# Patient Record
Sex: FEMALE | Race: WHITE | Employment: UNEMPLOYED | URBAN - METROPOLITAN AREA
[De-identification: names, ages, dates, MRNs, and addresses within clinical notes are randomized per-mention and may not be internally consistent; named-entity substitution may affect disease eponyms.]

---

## 2019-11-07 PROBLEM — I95.1 ORTHOSTATIC HYPOTENSION: Status: ACTIVE | Noted: 2019-11-07

## 2020-09-09 PROBLEM — M54.50 LOW BACK PAIN: Status: ACTIVE | Noted: 2020-09-09

## 2020-09-09 PROBLEM — M25.552 HIP PAIN, BILATERAL: Status: ACTIVE | Noted: 2020-09-09

## 2020-09-09 PROBLEM — D89.42 IDIOPATHIC MAST CELL ACTIVATION SYNDROME (HCC): Status: ACTIVE | Noted: 2020-09-09

## 2020-09-09 PROBLEM — M25.551 HIP PAIN, BILATERAL: Status: ACTIVE | Noted: 2020-09-09

## 2021-06-09 ENCOUNTER — TRANSCRIBE ORDER (OUTPATIENT)
Dept: SCHEDULING | Age: 51
End: 2021-06-09

## 2021-06-09 DIAGNOSIS — Z12.31 ENCOUNTER FOR SCREENING MAMMOGRAM FOR MALIGNANT NEOPLASM OF BREAST: Primary | ICD-10-CM

## 2022-03-18 PROBLEM — M25.552 HIP PAIN, BILATERAL: Status: ACTIVE | Noted: 2020-09-09

## 2022-03-18 PROBLEM — M25.551 HIP PAIN, BILATERAL: Status: ACTIVE | Noted: 2020-09-09

## 2022-03-18 PROBLEM — M54.50 LOW BACK PAIN: Status: ACTIVE | Noted: 2020-09-09

## 2022-03-19 PROBLEM — I95.1 ORTHOSTATIC HYPOTENSION: Status: ACTIVE | Noted: 2019-11-07

## 2022-03-20 PROBLEM — D89.42 IDIOPATHIC MAST CELL ACTIVATION SYNDROME (HCC): Status: ACTIVE | Noted: 2020-09-09

## 2022-05-26 DIAGNOSIS — E03.9 PRIMARY HYPOTHYROIDISM: Primary | ICD-10-CM

## 2022-06-29 ENCOUNTER — PATIENT MESSAGE (OUTPATIENT)
Dept: ENDOCRINOLOGY | Age: 52
End: 2022-06-29

## 2022-06-29 ENCOUNTER — TELEMEDICINE (OUTPATIENT)
Dept: ENDOCRINOLOGY | Age: 52
End: 2022-06-29
Payer: MEDICARE

## 2022-06-29 DIAGNOSIS — E06.3 HASHIMOTO'S THYROIDITIS: ICD-10-CM

## 2022-06-29 DIAGNOSIS — D35.2 PITUITARY MICROADENOMA (HCC): ICD-10-CM

## 2022-06-29 DIAGNOSIS — E03.9 PRIMARY HYPOTHYROIDISM: Primary | ICD-10-CM

## 2022-06-29 PROCEDURE — 99213 OFFICE O/P EST LOW 20 MIN: CPT | Performed by: INTERNAL MEDICINE

## 2022-06-29 RX ORDER — LEVOTHYROXINE SODIUM 50 MCG
50 TABLET ORAL DAILY
COMMUNITY
End: 2022-06-29 | Stop reason: SDUPTHER

## 2022-06-29 RX ORDER — DICLOFENAC SODIUM 25 MG/1
25 TABLET, DELAYED RELEASE ORAL 2 TIMES DAILY
COMMUNITY
Start: 2022-04-13

## 2022-06-29 RX ORDER — PYRIDOXINE HCL (VITAMIN B6) 50 MG
TABLET ORAL
COMMUNITY

## 2022-06-29 RX ORDER — LEVOTHYROXINE SODIUM 50 MCG
100 TABLET ORAL DAILY
Qty: 180 TABLET | Refills: 3
Start: 2022-06-29

## 2022-06-29 RX ORDER — LANOLIN ALCOHOL/MO/W.PET/CERES
400 CREAM (GRAM) TOPICAL DAILY
COMMUNITY

## 2022-06-29 NOTE — PROGRESS NOTES
Yamila Sutton MD, [address]            Reason for visit: Follow-up of hypothyroidism    I was in the office while conducting this encounter. Consent:  Timi Mccloud, who was evaluated through a synchronous (real-time) audio-video encounter, and/or her healthcare decision maker, is aware that it is a billable service, which includes applicable co-pays, with coverage as determined by her insurance carrier. She provided verbal consent to proceed and patient identification was verified. This visit was conducted pursuant to the emergency declaration under the [facility], [address] [facility] waiver authority and the [employer] Act. A caregiver was present when appropriate. Ability to conduct physical exam was limited. The patient was located at home in a state where the provider was licensed to provide care. This virtual visit was conducted via [address] Pursuant to the emergency declaration under the [facility], [facility] [facility] authority and the [employer] Act, this Virtual  Visit was conducted to reduce the patient's risk of exposure to COVID-19 and provide continuity of care for an established patient. Services were provided through a video synchronous discussion virtually to substitute for in-person clinic visit. Due to this being a TeleHealth evaluation, many elements of the physical examination are unable to be assessed. --Radha Gomez MD on 6/29/2022 at 11:32 AM          ASSESSMENT AND PLAN:    1. Primary hypothyroidism  She is now biochemically euthyroid, excluding her thyroid as a source of symptoms. She is having hot flashes, possibly due to menopause. She may be a candidate for postmenopausal hormone replacement.   She may wish to discuss it with Dr. Juanita Britton or see a gynecologist (I do not prescribe postmenopausal hormone replacement). - SYNTHROID 50 MCG tablet; Take 2 tablets by mouth Daily  Dispense: 180 tablet; Refill: 3  - TSH; Future  - T4, Free; Future    2. Hashimoto's thyroiditis    3. Pituitary microadenoma ([address]  This requires no additional evaluation. Follow-up and Dispositions    · Return in about 6 months (around 12/29/2022). History of Present Illness:    THYROID DYSFUNCTION  Joseph Castro is seen for follow-up of primary hypothyroidism; this was diagnosed in approximately 1989. She is known to have Hashimoto's thyroiditis.     Current symptoms:  See review of systems below     Prior treatment: She was started on Synthroid at diagnosis in ~1989. This was discontinued shortly thereafter. Synthroid was resumed in approximately 2003. She had taken 125 mcg (50 mcg tablets x 2.5) daily since December 2015.   Her dose has been adjusted as follows:  -125 mcg daily six days per week and 100 mcg daily one day per week 3/12/2018   -112.5 mcg daily 6/12/2018  -100 mcg daily 3/14/2022     Pertinent labs:  1/17/2012: TSH 4.700, free T4 1.56.  7/18/2012: TSH 3.000, free T4 1.58.  7/19/2013: TSH 2.0, free T4 0.98.  12/12/2013: TSH 3.080, T4 10.9.  8/19/2014: TSH 5.170, T4 9.6, free thyroxine index 2.6.  10/21/2014: TSH 1.390, T4 11.6, free thyroxine index 3.2.  12/22/2014: TSH 0.792, free T4 1.55.  6/17/2015: TSH 2.13, T4 11.2, free thyroxine index 3.0.  12/10/2015: TSH 6.070, T4 11.5, free thyroxine index 3.1.  2/12/2016: TSH 3.520, T4 10.3, free thyroxine index 2.9.  9/27/2016: TSH 1.54, free T4 1.05.  1/31/2017: TSH 1.030, free T4 1.57.  8/14/2017: TSH 0.493, free T4 1.66.  2/23/2018: TSH 0.323, free T4 1.51.  5/30/2018: TSH 0.266, free T4 1.61.  7/17/2018: TSH 2.570, free T4 1.45.  10/17/2018: TSH 1.560, free T4 1.70.  4/16/2019:  TSH 0.573, free T4 1.44.  10/24/2019: TSH 1.100, free T4 1.53.  4/22/2020: TSH 0.873, free T4 1.70.  9/17/2020: TSH 0.264, free T4 1.62.  3/11/2021: TSH 2.220, free T4 1.26.  2021: TSH 1.200, free T4 1.61.  3/8/2022: TSH 0.149, free T4 1.81, T3 80.  2022: TSH 1.330, free T4 1. 54.     Imagin2015: Ultrasound- 0.4 cm hypoechoic nodule/cyst in the left lobe.     3/12/2018: Ultrasound ([place])- Right lobe 1.12 x 0.93 x 4.10 cm, isthmus 0.16 cm, left lobe 0.80 x 1.37 x 4.30 cm. Homogeneous echotexture. Normal blood flow. No nodules.        PITUITARY DISEASE  Nikko Valerio is here for follow-up of a pituitary microadenoma, first noted in approximately . She was previously under the care of an endocrinologist, Dr. Russell Orr, in [place].     Symptoms: See review of systems below     Menstrual/pregnancy history: She is amenorrheic due to prior endometrial ablation. She has no prior pregnancies and no plans for children.     Imagin2017: MRI ([facility])- 5 mm midline pituitary adenoma.     Laboratory evaluation:  2012: Cortisol 16, prolactin 12, IGF-I 194, estradiol 87.0, LH 7.7, FSH 9.4.  2012: Prolactin 11, IGF-I 176.  2014: Cortisol 14.8, ACTH 43.6, estradiol 26.7.    2014: Estradiol 90.4, LH 16.7, FSH 11.5.  12/10/2015: Cortisol 18.9, ACTH 68.5, prolactin 18.9, IGF-I 139 estradiol 48.4, LH 4.5, FSH 8.2.  2016: Cortisol 15.1, ACTH 52.7.  2016: Prolactin 14. 7.     Prior/current treatment: none. She has taken hydrocortisone off and on for treatment of systemic mast cell disease (managed by Dr. David Hurt). Review of Systems   Constitutional: Positive for fatigue (improved). Negative for unexpected weight change (stable). Musculoskeletal: Positive for arthralgias. Psychiatric/Behavioral: Positive for sleep disturbance (insomnia improved). There were no vitals taken for this visit. Wt Readings from Last 3 Encounters:   22 117 lb (53.1 kg)   21 117 lb (53.1 kg)       Physical Exam  Constitutional:       Appearance: Normal appearance.    HENT:      Head: Normocephalic. Nose: Nose normal.   Eyes:      Extraocular Movements: Extraocular movements intact. Pulmonary:      Effort: Pulmonary effort is normal.   Musculoskeletal:         General: Normal range of motion. Right shoulder: Normal.      Left shoulder: Normal.      Cervical back: Normal range of motion. Skin:     Coloration: Skin is not jaundiced or pale. Neurological:      General: No focal deficit present. Mental Status: She is alert. Mental status is at baseline. Psychiatric:         Mood and Affect: Mood normal.         Behavior: Behavior normal.         Orders Placed This Encounter   Procedures    TSH     Standing Status:   Future     Standing Expiration Date:   6/29/2023    T4, Free     Standing Status:   Future     Standing Expiration Date:   6/29/2023       Current Outpatient Medications   Medication Sig Dispense Refill    diclofenac (VOLTAREN) 25 MG EC tablet Take 25 mg by mouth in the morning and at bedtime      Cyanocobalamin (B-12) 100 MCG TABS Place under the tongue      folic acid (FOLVITE) 953 MCG tablet Take 400 mcg by mouth daily      SYNTHROID 50 MCG tablet Take 2 tablets by mouth Daily 180 tablet 3    acetaminophen (TYLENOL) 650 MG extended release tablet Take 650 mg by mouth every 6 hours as needed      vitamin D 25 MCG (1000 UT) CAPS Take by mouth daily      clobetasol propionate 0.05 % GEL gel Apply topically as needed      clonazePAM (KLONOPIN) 0.125 MG disintegrating tablet Take 0.125 mg by mouth as needed.       cromolyn (NASALCROM) 5.2 MG/ACT nasal spray 1 spray 4 times daily      EPINEPHrine (EPIPEN) 0.3 MG/0.3ML SOAJ injection Inject 0.3 mg into the muscle once as needed      famotidine (PEPCID) 20 MG tablet Take 20 mg by mouth 3 times daily      fexofenadine (ALLEGRA) 180 MG tablet Take 180 mg by mouth 3 times daily      hydrocortisone (CORTEF) 5 MG tablet Take 5 mg by mouth daily as needed      Hydrocortisone Butyrate 0.1 % CREA Apply topically daily      Hyoscyamine Sulfate SL 0.125 MG SUBL Place 0.125 mg under the tongue every 4 hours as needed      ketotifen (ZADITOR) 0.025 % ophthalmic solution Apply 1 drop to eye 2 times daily      zileuton (ZYFLO CR) 600 MG extended release tablet Take 600 mg by mouth       No current facility-administered medications for this visit. Cornelious Gowers, MD, FACE      Portions of this note were generated with the assistance of voice recognition software. As such, some errors in transcription may be present.

## 2022-07-20 ENCOUNTER — HOSPITAL ENCOUNTER (OUTPATIENT)
Dept: PHYSICAL THERAPY | Age: 52
Setting detail: RECURRING SERIES
Discharge: HOME | End: 2022-07-23
Payer: MEDICARE

## 2022-07-20 PROCEDURE — 97110 THERAPEUTIC EXERCISES: CPT

## 2022-07-20 PROCEDURE — 97162 PT EVAL MOD COMPLEX 30 MIN: CPT

## 2022-07-20 ASSESSMENT — PAIN SCALES - GENERAL: PAINLEVEL_OUTOF10: 2

## 2022-07-20 NOTE — PLAN OF CARE
Gia Garcia  : 1970  Primary: [address] Medicare Advantage  Secondary:  [address] @ [address] [place], [place] [address]  Phone: [phone]  Fax: [phone] Plan Frequency: 2x/week for 8 weeks  Plan of Care/Certification Expiration Date: 22    PT Visit Info: Total # of Visits to Date: 1      OUTPATIENT PHYSICAL THERAPY:OP NOTE TYPE: Initial Assessment 2022               Episode  Appt Desk         Treatment Diagnosis:  Pain in Left Shoulder (M25.512)  Pain in left hip (M25.552)  Pain in Left Knee (M25.562)  Pain in Right Hip (M25.551)  * No diagnoses found *  Medical/Referring Diagnosis:  Right hip impingement syndrome [M25.851]  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 She has worked with PT previously on hips and states some improvement overall, but that hips are still bothersome. Her knee was more recently exacerbated by travel and inc walking. The pain is tight/grinding/sharp/shooting in knee and located primarily over pes anserine, but can shift location. She does have systemic mast cell disease which causes inflammatory flareups.    Patient Stated Goal(s):  \"walk and sleep without pain\"  Initial:     2/10 Post Session:     6/10  Past Medical History/Comorbidities:   Ms. Codie Diaz  has a past medical history of Anemia, Anxiety and depression, Basal cell carcinoma of lower extremity, GERD (gastroesophageal reflux disease), Hashimoto's thyroiditis, Irritable bowel syndrome, Mitral regurgitation, Orthostatic hypotension, Osteopenia, Pituitary microadenoma ([address] Primary hypothyroidism, Systemic mast cell disease, and Vitamin B12 deficiency. Ms. Daiana Aviles  has a past surgical history that includes Endometrial ablation (2009); [facility] tooth extraction; malignant skin lesion excision; and Dilation and curettage of uterus. Social History/Living Environment:   Lives With: Spouse  Home Layout: One level   Prior Level of Function/Work/Activity:   Prior level of function: Independent  Occupation: [place] [id number] [employer] recorded   Learning:   Does the patient/guardian have any barriers to learning?: No barriers   Fall Risk Scale: Total Score: 0  Solorio Fall Risk: Low (0-24)       OBJECTIVE   ROM:   L knee ext to -3, uncomfortable. Pt rests in slight flexion  Otherwise ROM NT due to high irritability    Strength:   L HS, quad, add grossly 4/5 and mild pain throughout    Palpation/Joint Mobility:   *Pes anserine, mild in medial quad  Lateral quad NT due to \"painful lump\" per pt    Special Tests:   NT    Functional Tests/Measures:   WFL gait  Squat grossly WFL, painfree with good knee and hip flexion      ASSESSMENT   Initial Assessment:  Radha Desir is a 47 y.o. female who presents to Physical Therapy with signs and symptoms consistent with diagnosis of L knee pain, B hip pain, L shoulder pain. Patient presents today with dec ROM, inc pain, dec endurance/strength, dec exercise tolerance. These deficits limit the patients ability to perform these tasks: walking, sleeping, 1 mile hikes. Patient will benefit from skilled therapy to allow the patient to meet set goals and return to [facility], in which the above noted functional activities were not impaired. Problem List: (Impacting functional limitations): Body Structures, Functions, Activity Limitations Requiring Skilled Therapeutic Intervention: Decreased functional mobility ; Decreased ADL status;  Decreased ROM; Decreased strength; Decreased endurance; Decreased balance; Decreased coordination; Increased pain   Therapy Prognosis:   Therapy Prognosis: Fair   Assessment Complexity:   Medium Complexity  PLAN   Effective Dates: 07/20/22  TO Plan of Care/Certification Expiration Date: 09/20/22   Frequency/Duration: Plan Frequency: 2x/week for 8 weeks   Interventions Planned (Treatment may consist of any combination of the following):    Current Treatment Recommendations: Strengthening; ROM; Balance training; Endurance training; Functional mobility training; Stair training; Gait training; Neuromuscular re-education; Manual Therapy - Soft Tissue Mobilization; Manual Therapy - Joint Manipulation; Pain management; Home exercise program; Modalities; Integrated dry needling; Therapeutic activities   Goals: (Goals have been discussed and agreed upon with patient.)  Short-Term Functional Goals: Time Frame: 4 weeks  Knee extension painfree to >3 deg hyperextension for improved gait  Max knee pain improved to 6/10 for improved sleep  Discharge Goals: Time Frame: 8 weeks  Independent with HEP and progression  Demo goblet squat with good form 10# x5 for improved ability to hike         Outcome Measure: Tool Used: Lower Extremity Functional Scale (LEFS)  Score:  Initial: 36/80 Most Recent: X/80 (Date: -- )   Interpretation of Score: 20 questions each scored on a 5 point scale with 0 representing \"extreme difficulty or unable to perform\" and 4 representing \"no difficulty\". The lower the score, the greater the functional disability. 80/80 represents no disability. Minimal detectable change is 9 points. Medical Necessity:   > Skilled intervention continues to be required due to dec ROM, inc pain, dec endurance. Reason For Services/Other Comments:  > Patient continues to require skilled intervention due to limited ability to progress ability independently.   Total Duration:  Time In: 1615  Time Out: 1700    Regarding Irais Olmstead therapy, I certify that the treatment plan above will be carried out by a therapist or under their direction. Thank you for this referral,  Margarette Fraga, PT     Referring Physician Signature:  Carmen Cadet MD _______________________________ Date _____________        Post Session Pain  Charge Capture  PT Visit Info  POC Link  Treatment Note Link  MD Guidelines  [facility]

## 2022-07-20 NOTE — PROGRESS NOTES
Jarvis Portillo  : 1970  Primary: [address] Medicare Advantage  Secondary:  [address] @ [address]  [facility] [address]  Phone: [phone]  Fax: [phone] No data recorded  No data recorded    PT Visit Info: Total # of Visits to Date: 1    OUTPATIENT PHYSICAL THERAPY:OP NOTE TYPE: Treatment Note 2022       Episode  }Appt Desk             Treatment Diagnosis:  Pain in Left Shoulder (M25.512)  Pain in left hip (M25.552)  Pain in Left Knee (M25.562)  Pain in Right Hip (M25.551)  Medical/Referring Diagnosis:  Right hip impingement syndrome [M25.851]  Chondromalacia of left knee [V02.253]  Referring Physician:  Moni Kaufman MD MD Orders:  PT Eval and Treat   Date of Onset:  No data recorded   Allergies:   Latex, Aspirin, Amoxicillin, Erythromycin, Mometasone, Montelukast, and Petrolatum  Restrictions/Precautions:  No data recordedNo data recorded   Interventions Planned (Treatment may consist of any combination of the following):    No data recorded   Subjective Comments:see initial evaluation     Initial:}    2/10Post Session:       6/10  Medications Last Reviewed:  2022  Updated Objective Findings:  see eval   Treatment   THERAPEUTIC ACTIVITY: (see below for minutes): Therapeutic activities below to improve mobility, strength, balance and coordination. Required minimal visual, verbal, manual and tactile cues to improve independence and safety with daily activities. THERAPEUTIC EXERCISE: (see below for minutes): Exercises below to improve mobility, strength, balance and coordination. Required minimal verbal and manual cues to promote proper body alignment, promote proper body posture and promote proper body mechanics. Progressed resistance, range, repetitions and complexity of movement as indicated. NEUROMUSCULAR RE-ED: (see below for minutes): Neuromuscular re-education to restore normal body movement patterns.   MANUAL THERAPY: (see below for minutes): Joint mobilization and Soft tissue mobilization was utilized and necessary because of the patient's restricted joint motion, painful spasm, loss of articular motion, and restricted motion of soft tissue. MODALITIES: (see below for minutes): to decrease pain and/or swelling  GAIT TRAINING: (see below for minutes): Gait training to improve and/or restore physical functioning as related to mobility, balance and coordination  SELF CARE: (see below for minutes): Self care to improve and/or restore self-care/home management as related to dressing, bathing and grooming. Required minimal verbal cueing to facilitate activities of daily living skills and compensatory activities  MECHANICAL TRACTION: (see below for minutes): Traction was used due to the patient's radiculopathy in order to relieve pain in or originating from the spine. Manual:   Not performed     Exercises/Activities: no charge   Quad set over roll 20x3\"  Prone TKE  20x3\"  Cossack squat  Supported at [employer], x10 B     HEP and Education:  HEP as above, progression, rationale, monitoring of symptoms  Pt has verbalized and demonstrated compliance with HEP and scheduled appointments. Modalities:  N/a       Treatment/Session Summary:    Treatment Assessment: see initial eval     Communication/Consultation:  None today  Equipment provided today:  None  Recommendations/Intent for next treatment session: Next visit will focus on pes retraining, assessment of L shoulder as able.     Total Treatment Billable Duration:  45 minutes  Time In: 6071  Time Out: [address] Chevy Grier PT       Charge Capture  }Post Session Pain  PT Visit Info  [facility] Portal  MD Guidelines  Scanned Media  Benefits  MyChart    Future Appointments   Date Time Provider Robert Conti   7/22/2022  8:45 AM Nimisha Doyle PT [facility]   7/26/2022 10:15 AM Nimisha Doyle PT [facility]   7/29/2022 10:15 AM Nimisha Doyle PT [facility]   1/3/2023  1:30 PM Renee Welch MD END [facility]

## 2022-07-22 ENCOUNTER — APPOINTMENT (OUTPATIENT)
Dept: PHYSICAL THERAPY | Age: 52
End: 2022-07-22
Payer: MEDICARE

## 2022-07-26 ENCOUNTER — APPOINTMENT (OUTPATIENT)
Dept: PHYSICAL THERAPY | Age: 52
End: 2022-07-26
Payer: MEDICARE

## 2022-07-27 ENCOUNTER — HOSPITAL ENCOUNTER (OUTPATIENT)
Dept: PHYSICAL THERAPY | Age: 52
Setting detail: RECURRING SERIES
Discharge: HOME | End: 2022-07-30
Payer: MEDICARE

## 2022-07-27 PROCEDURE — 97110 THERAPEUTIC EXERCISES: CPT

## 2022-07-27 ASSESSMENT — PAIN SCALES - GENERAL: PAINLEVEL_OUTOF10: 0

## 2022-07-27 NOTE — PROGRESS NOTES
Marsha Cox  : 1970  Primary: [address] Medicare Advantage  Secondary:  [address] @ [address] [place] [address]  Phone: [phone]  Fax: [phone] Plan Frequency: 2x/week for 8 weeks  Plan of Care/Certification Expiration Date: 22      PT Visit Info: Total # of Visits to Date: 2      OUTPATIENT PHYSICAL THERAPY:OP NOTE TYPE: Treatment Note 2022       Episode  }Appt Desk             Treatment Diagnosis:  Pain in Left Shoulder (M25.512)  Pain in left hip (M25.552)  Pain in Left Knee (M25.562)  Pain in Right Hip (M25.551)  Medical/Referring Diagnosis:  Right hip impingement syndrome [M25.851]  Chondromalacia of left knee [P26.254]  Referring Physician:  Ewa Lucero MD MD Orders:  PT Eval and Treat   Date of Onset:  No data recorded   Allergies:   Latex, Aspirin, Amoxicillin, Erythromycin, Mometasone, Montelukast, and Petrolatum  Restrictions/Precautions:  No data recordedNo data recorded   Interventions Planned (Treatment may consist of any combination of the following):    Current Treatment Recommendations: Strengthening; ROM; Balance training; Endurance training; Functional mobility training; Stair training; Gait training; Neuromuscular re-education; Manual Therapy - Soft Tissue Mobilization; Manual Therapy - Joint Manipulation; Pain management; Home exercise program; Modalities; Integrated dry needling; Therapeutic activities     Subjective Comments:Pt was very fatigued after last treatment. She says this is normal for her but to make sure new exercises are added earlier in the session if she needs to be able to recall them later. Overall feeling okay today and HEP went well. Initial:}    0/10Post Session:       0/10  Medications Last Reviewed:  2022  Updated Objective Findings:  see eval   Treatment   THERAPEUTIC ACTIVITY: (see below for minutes):  Therapeutic activities below to improve mobility, strength, balance and coordination. Required minimal visual, verbal, manual and tactile cues to improve independence and safety with daily activities. THERAPEUTIC EXERCISE: (see below for minutes): Exercises below to improve mobility, strength, balance and coordination. Required minimal verbal and manual cues to promote proper body alignment, promote proper body posture and promote proper body mechanics. Progressed resistance, range, repetitions and complexity of movement as indicated. NEUROMUSCULAR RE-ED: (see below for minutes): Neuromuscular re-education to restore normal body movement patterns. MANUAL THERAPY: (see below for minutes): Joint mobilization and Soft tissue mobilization was utilized and necessary because of the patient's restricted joint motion, painful spasm, loss of articular motion, and restricted motion of soft tissue. MODALITIES: (see below for minutes): to decrease pain and/or swelling  GAIT TRAINING: (see below for minutes): Gait training to improve and/or restore physical functioning as related to mobility, balance and coordination  SELF CARE: (see below for minutes): Self care to improve and/or restore self-care/home management as related to dressing, bathing and grooming. Required minimal verbal cueing to facilitate activities of daily living skills and compensatory activities  MECHANICAL TRACTION: (see below for minutes): Traction was used due to the patient's radiculopathy in order to relieve pain in or originating from the spine. Manual:   Not performed     Exercises/Activities: 45' TE 5' supervision by Susana Jones PT  Quad set over roll 20x3\"  Prone TKE  20x5\" with bolster  Cossack squat  Supported at high mat, x10 B  - held  Standing hip add Slider 11 then 8 B  Toe up bridge  3x8, 5\" lower   Standing TKE  20x3\"  Dock swings  1'    L shoulder exercise - nv?     HEP and Education:  HEP as above, progression, rationale, monitoring of symptoms  Pt has verbalized and demonstrated compliance with HEP and scheduled appointments. Modalities:  N/a       Treatment/Session Summary:    Treatment Assessment: Pt with some inc in pain during session but this was modifiable by changing reps or positioning. Initiated adductor slide to replace cossack squat at home. Communication/Consultation:  None today  Equipment provided today:  None  Recommendations/Intent for next treatment session: Next visit will focus on pes retraining, assessment of L shoulder as able.     Total Treatment Billable Duration:  45 minutes  Time In: 6166  Time Out: [address] Paola Zamudio, PT       Charge Capture  }Post Session Pain  PT Visit Info  [employer] Portal  MD Guidelines  Scanned Media  Benefits  MyChart    Future Appointments   Date Time Provider Robert Conti   7/29/2022  4:15 PM Kaleen Corwith, PT [facility]   8/1/2022  3:30 PM Kaleen Corwith, PT [facility]   8/3/2022  3:30 PM Kaleen Corwith, PT [facility]   8/9/2022  2:00 PM Kaleen Corwith, PT [facility]   8/10/2022  3:30 PM Kaleen Corwith, PT [facility]   8/15/2022  3:30 PM Kaleen Corwith, PT [facility]   8/17/2022  3:30 PM Kaleen Corwith, PT [facility]   8/23/2022  2:00 PM Kaleen Corwith, PT [facility]   8/24/2022  3:30 PM Kaleen Corwith, PT [facility]   8/29/2022  3:30 PM Kaleen Corwith, PT [facility]   8/31/2022  3:30 PM Kaleen Corwith, PT [facility]   1/3/2023  1:30 PM Abby Dejesus MD END [facility]

## 2022-07-29 ENCOUNTER — HOSPITAL ENCOUNTER (OUTPATIENT)
Dept: PHYSICAL THERAPY | Age: 52
Setting detail: RECURRING SERIES
Discharge: HOME | End: 2022-08-01
Payer: MEDICARE

## 2022-07-29 PROCEDURE — 97140 MANUAL THERAPY 1/> REGIONS: CPT

## 2022-07-29 PROCEDURE — 97110 THERAPEUTIC EXERCISES: CPT

## 2022-07-29 ASSESSMENT — PAIN SCALES - GENERAL: PAINLEVEL_OUTOF10: 1

## 2022-07-29 NOTE — PROGRESS NOTES
independence and safety with daily activities. THERAPEUTIC EXERCISE: (see below for minutes): Exercises below to improve mobility, strength, balance and coordination. Required minimal verbal and manual cues to promote proper body alignment, promote proper body posture and promote proper body mechanics. Progressed resistance, range, repetitions and complexity of movement as indicated. NEUROMUSCULAR RE-ED: (see below for minutes): Neuromuscular re-education to restore normal body movement patterns. MANUAL THERAPY: (see below for minutes): Joint mobilization and Soft tissue mobilization was utilized and necessary because of the patient's restricted joint motion, painful spasm, loss of articular motion, and restricted motion of soft tissue. MODALITIES: (see below for minutes): to decrease pain and/or swelling  GAIT TRAINING: (see below for minutes): Gait training to improve and/or restore physical functioning as related to mobility, balance and coordination  SELF CARE: (see below for minutes): Self care to improve and/or restore self-care/home management as related to dressing, bathing and grooming. Required minimal verbal cueing to facilitate activities of daily living skills and compensatory activities  MECHANICAL TRACTION: (see below for minutes): Traction was used due to the patient's radiculopathy in order to relieve pain in or originating from the spine.     Manual: 8'  Gr II ap and inf glide    Exercises/Activities: 27' TE  SL ER   L 3x8 with bolster, 1 min break in between  SB bridge   3x6, 1 min break in between  SB curl   3x10  Education - current findings, rationale, progression of shoulder control and mobility    Held  Quad set over roll 20x3\"  Prone TKE  20x5\" with bolster  Cossack squat  Supported at high mat, x10 B  - held  Standing hip add Slider 11 then 8 B  Toe up bridge  3x8, 5\" lower   Standing TKE  20x3\"  Dock swings  1'      HEP and Education:  HEP as above, progression, rationale, monitoring of symptoms  Pt has verbalized and demonstrated compliance with HEP and scheduled appointments. Modalities:  N/a       Treatment/Session Summary:    Treatment Assessment: Improved shoulder pain and mobility after manual interventions and light exercise. Able to progress with glut/HS control. Some popping felt in lateral HS with ball curls; this was relieved with external support/overpressure. Communication/Consultation:  None today  Equipment provided today:  None  Recommendations/Intent for next treatment session: Next visit will focus on pes retraining, assessment of L shoulder as able.     Total Treatment Billable Duration:  45 minutes  Time In: 0041  Time Out: [address] Grier, PT       Charge Capture  }Post Session Pain  PT Visit Info  [employer] Portal  MD Guidelines  Scanned Media  Benefits  MyChart    Future Appointments   Date Time Provider Robert Conti   8/1/2022  3:30 PM [facility]   8/3/2022  3:30 PM Saul Gave, PT [facility]   8/9/2022  2:00 PM Saul Gave, PT [facility]   8/11/2022  2:45 PM Saul Gave, PT [facility]   8/15/2022  3:30 PM Saul Gave, PT [facility]   8/17/2022  3:30 PM Saul Gave, PT [facility]   8/23/2022  2:00 PM Saul Gave, PT [facility]   8/24/2022  3:30 PM Saul Gave, PT [facility]   8/29/2022  3:30 PM Saul Gave, PT [facility]   8/31/2022  3:30 PM Saul Gave, PT [facility]   1/3/2023  1:30 PM Justina Ruiz MD [facility]

## 2022-08-01 ENCOUNTER — HOSPITAL ENCOUNTER (OUTPATIENT)
Dept: PHYSICAL THERAPY | Age: 52
Setting detail: RECURRING SERIES
Discharge: HOME | End: 2022-08-04
Payer: MEDICARE

## 2022-08-01 PROCEDURE — 97110 THERAPEUTIC EXERCISES: CPT

## 2022-08-01 ASSESSMENT — PAIN SCALES - GENERAL: PAINLEVEL_OUTOF10: 1

## 2022-08-01 NOTE — PROGRESS NOTES
Anirudh Castellano  : 1970  Primary: [address] Medicare Advantage  Secondary:  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]  Referring Physician:  Luis Alfredo Marte MD MD Orders:  PT Eval and Treat   Date of Onset:  No data recorded   Allergies:   Latex, Aspirin, Amoxicillin, Erythromycin, Mometasone, Montelukast, and Petrolatum  Restrictions/Precautions:  No data recordedNo data recorded   Interventions Planned (Treatment may consist of any combination of the following):    Current Treatment Recommendations: Strengthening; ROM; Balance training; Endurance training; Functional mobility training; Stair training; Gait training; Neuromuscular re-education; Manual Therapy - Soft Tissue Mobilization; Manual Therapy - Joint Manipulation; Pain management; Home exercise program; Modalities; Integrated dry needling; Therapeutic activities     Subjective Comments: Pt reports cont inc mobility in L shoulder since last visit. She did feel like entire L side of body tightened up over the weekend, but overall no significant inc in pain. Initial:}    1/10Post Session:       0/10  Medications Last Reviewed:  2022  Updated Objective Findings:  see eval   Treatment   THERAPEUTIC ACTIVITY: (see below for minutes): Therapeutic activities below to improve mobility, strength, balance and coordination.  Required minimal visual, verbal, manual and tactile cues TKE  20x3\"  Dock swings  1'      HEP and Education:  HEP as above, progression, rationale, monitoring of symptoms  Pt has verbalized and demonstrated compliance with HEP and scheduled appointments. Modalities:  N/a       Treatment/Session Summary:    Treatment Assessment: Pt did very well with new additions today. No significant fatigue reported at end of treatment. Communication/Consultation:  None today  Equipment provided today:  None  Recommendations/Intent for next treatment session: Next visit will focus on pes retraining, assessment of L shoulder as able.     Total Treatment Billable Duration:  45 minutes  Time In: 1268  Time Out: [address], PT       Charge Capture  }Post Session Pain  PT Visit Info  [employer] Portal  MD Guidelines  Scanned Media  Benefits  MyChart    Future Appointments   Date Time Provider [place]   8/3/2022  3:30 PM Teodora Lights, PT [facility]   8/9/2022  2:00 PM Teodora Lights, PT [facility]   8/11/2022  2:45 PM Teodora Lights, PT [facility]   8/15/2022  3:30 PM Teodora Lights, PT [facility]   8/17/2022  3:30 PM Teodora Lights, PT [facility]   8/23/2022  2:00 PM Teodora Lights, PT [facility]   8/24/2022  3:30 PM Teodora Lights, PT [facility]   8/29/2022  3:30 PM Teodora Lights, PT [facility]   8/31/2022  3:30 PM Teodora Lights, PT [facility]   1/3/2023  1:30 PM Ashlee Lemus MD END [facility]

## 2022-08-03 ENCOUNTER — HOSPITAL ENCOUNTER (OUTPATIENT)
Dept: PHYSICAL THERAPY | Age: 52
Setting detail: RECURRING SERIES
Discharge: HOME | End: 2022-08-06
Payer: MEDICARE

## 2022-08-03 PROCEDURE — 97110 THERAPEUTIC EXERCISES: CPT

## 2022-08-03 ASSESSMENT — PAIN SCALES - GENERAL: PAINLEVEL_OUTOF10: 4

## 2022-08-03 NOTE — PROGRESS NOTES
Loida Nava  : 1970  Primary: [address] Medicare Advantage  Secondary:  [address] @ [address] [place], [place] [id number]  Phone: [phone]  Fax: [phone] Plan Frequency: 2x/week for 8 weeks  Plan of Care/Certification Expiration Date: 22      PT Visit Info: Total # of Visits to Date: 5      OUTPATIENT PHYSICAL THERAPY:OP NOTE TYPE: Treatment Note 8/3/2022       Episode  }Appt Desk             Treatment Diagnosis:  Pain in Left Shoulder (M25.512)  Pain in left hip (M25.552)  Pain in Left Knee (M25.562)  Pain in Right Hip (M25.551)  Medical/Referring Diagnosis:  Right hip impingement syndrome [M25.851]  Chondromalacia of left knee [S19.848]  Referring Physician:  Gena Lancaster MD MD Orders:  PT Eval and Treat   Date of Onset:  No data recorded   Allergies:   Latex, Aspirin, Amoxicillin, Erythromycin, Mometasone, Montelukast, and Petrolatum  Restrictions/Precautions:  No data recordedNo data recorded   Interventions Planned (Treatment may consist of any combination of the following):    Current Treatment Recommendations: Strengthening; ROM; Balance training; Endurance training; Functional mobility training; Stair training; Gait training; Neuromuscular re-education; Manual Therapy - Soft Tissue Mobilization; Manual Therapy - Joint Manipulation; Pain management; Home exercise program; Modalities; Integrated dry needling; Therapeutic activities     Subjective Comments: Pt states she went on short walks in the evening of last session and the following morning. She did great in the evening but had some inc fatigue and discomfort after the morning walk. Today, L knee is a little bothersome and so is R hip. Initial:}    4/10Post Session:       3/10  Medications Last Reviewed:  8/3/2022  Updated Objective Findings:  see eval   Treatment   THERAPEUTIC ACTIVITY: (see below for minutes):  Therapeutic activities below to improve mobility, strength, balance and coordination. Required minimal visual, verbal, manual and tactile cues to improve independence and safety with daily activities. THERAPEUTIC EXERCISE: (see below for minutes): Exercises below to improve mobility, strength, balance and coordination. Required minimal verbal and manual cues to promote proper body alignment, promote proper body posture and promote proper body mechanics. Progressed resistance, range, repetitions and complexity of movement as indicated. NEUROMUSCULAR RE-ED: (see below for minutes): Neuromuscular re-education to restore normal body movement patterns. MANUAL THERAPY: (see below for minutes): Joint mobilization and Soft tissue mobilization was utilized and necessary because of the patient's restricted joint motion, painful spasm, loss of articular motion, and restricted motion of soft tissue. MODALITIES: (see below for minutes): to decrease pain and/or swelling  GAIT TRAINING: (see below for minutes): Gait training to improve and/or restore physical functioning as related to mobility, balance and coordination  SELF CARE: (see below for minutes): Self care to improve and/or restore self-care/home management as related to dressing, bathing and grooming. Required minimal verbal cueing to facilitate activities of daily living skills and compensatory activities  MECHANICAL TRACTION: (see below for minutes): Traction was used due to the patient's radiculopathy in order to relieve pain in or originating from the spine.     Manual: 8' - held  Gr II ap and inf glide    Exercises/Activities: 36' TE  SB curl   2x8  SB bridge   3x6, 1 min break in between   DL HR   2x15  Sidestep at high mat 5x mini squat  B stance RDL  2x8 B - inc cueing    Held  SL ER   L 3x8 with bolster, 1 min break in between  Supine punch  Red 3x8, 1 min break in between  Quad set over roll 30x5\"    Held  Prone TKE  20x5\" with bolster  Cossack squat  Supported at high mat, x10 B  - held  Standing hip add Slider 11 then 8

## 2022-08-09 ENCOUNTER — HOSPITAL ENCOUNTER (OUTPATIENT)
Dept: PHYSICAL THERAPY | Age: 52
Setting detail: RECURRING SERIES
Discharge: HOME | End: 2022-08-12
Payer: MEDICARE

## 2022-08-09 PROCEDURE — 97140 MANUAL THERAPY 1/> REGIONS: CPT

## 2022-08-09 PROCEDURE — 97110 THERAPEUTIC EXERCISES: CPT

## 2022-08-09 ASSESSMENT — PAIN SCALES - GENERAL: PAINLEVEL_OUTOF10: 5

## 2022-08-09 NOTE — PROGRESS NOTES
Lety Beth  : 1970  Primary: [address] Medicare Advantage  Secondary:  [address] @ [address] [place] [address]  Phone: [phone]  Fax: [phone] Plan Frequency: 2x/week for 8 weeks  Plan of Care/Certification Expiration Date: 22      PT Visit Info: Total # of Visits to Date: 6      OUTPATIENT PHYSICAL THERAPY:OP NOTE TYPE: Treatment Note 2022       Episode  }Appt Desk             Treatment Diagnosis:  Pain in Left Shoulder (M25.512)  Pain in left hip (M25.552)  Pain in Left Knee (M25.562)  Pain in Right Hip (M25.551)  Medical/Referring Diagnosis:  Right hip impingement syndrome [M25.851]  Chondromalacia of left knee [J22.430]  Referring Physician:  Kyle Martin MD MD Orders:  PT Eval and Treat   Date of Onset:  No data recorded   Allergies:   Latex, Aspirin, Amoxicillin, Erythromycin, Mometasone, Montelukast, and Petrolatum  Restrictions/Precautions:  No data recordedNo data recorded   Interventions Planned (Treatment may consist of any combination of the following):    Current Treatment Recommendations: Strengthening; ROM; Balance training; Endurance training; Functional mobility training; Stair training; Gait training; Neuromuscular re-education; Manual Therapy - Soft Tissue Mobilization; Manual Therapy - Joint Manipulation; Pain management; Home exercise program; Modalities; Integrated dry needling; Therapeutic activities     Subjective Comments: Pt had an increase in knee pain the evening of last treatment. It took 2-3 days to calm down. It is hurting a little more today overall. Initial:}    5/10Post Session:       4/10  Medications Last Reviewed:  2022  Updated Objective Findings:  see eval   Treatment   THERAPEUTIC ACTIVITY: (see below for minutes): Therapeutic activities below to improve mobility, strength, balance and coordination.  Required minimal visual, verbal, manual and tactile cues to improve independence and safety with daily activities. THERAPEUTIC EXERCISE: (see below for minutes): Exercises below to improve mobility, strength, balance and coordination. Required minimal verbal and manual cues to promote proper body alignment, promote proper body posture and promote proper body mechanics. Progressed resistance, range, repetitions and complexity of movement as indicated. NEUROMUSCULAR RE-ED: (see below for minutes): Neuromuscular re-education to restore normal body movement patterns. MANUAL THERAPY: (see below for minutes): Joint mobilization and Soft tissue mobilization was utilized and necessary because of the patient's restricted joint motion, painful spasm, loss of articular motion, and restricted motion of soft tissue. MODALITIES: (see below for minutes): to decrease pain and/or swelling  GAIT TRAINING: (see below for minutes): Gait training to improve and/or restore physical functioning as related to mobility, balance and coordination  SELF CARE: (see below for minutes): Self care to improve and/or restore self-care/home management as related to dressing, bathing and grooming. Required minimal verbal cueing to facilitate activities of daily living skills and compensatory activities  MECHANICAL TRACTION: (see below for minutes): Traction was used due to the patient's radiculopathy in order to relieve pain in or originating from the spine.     Manual: 8'  Gr II ap and inf glide - held  Tennis ball roll adductor (STR)    Exercises/Activities: 28' TE  SB curl   2x8 - held  SB bridge   3x6, 1 min break in between   DL HR   2x12  Sidestep at high mat 3x mini squat  B stance RDL  2x8 B - inc cueing  Donkey kick  Slow lower - held    [employer] ER   L 3x8 with bolster, 1 min break in between  Supine punch  Red 3x8, 1 min break in between  Quad set over roll 30x5\"    Held  Prone TKE  20x5\" with bolster  Cossack squat  Supported at high mat, x10 B  - held  Standing hip add Slider 11 then 8 B  Toe up bridge  3x8, 5\" lower   Standing TKE  20x3\"  Dock swings  1'      HEP and Education:  HEP as above, progression, rationale, monitoring of symptoms  Pt has verbalized and demonstrated compliance with HEP and scheduled appointments. Modalities:  N/a       Treatment/Session Summary:    Treatment Assessment: Some improvement in medial knee pain after STR. Discussed use of tennis ball rolling at home. Reduced intensity of exercises but continued with similar exercises to last visit. Communication/Consultation:  None today  Equipment provided today:  None  Recommendations/Intent for next treatment session: Next visit will focus on pes retraining, assessment of L shoulder as able.     Total Treatment Billable Duration:  45 minutes  Time In: 1400  Time Out: [address], PT       Charge Capture  }Post Session Pain  PT Visit Info  [employer] Portal  MD Guidelines  Scanned Media  Benefits  MyChart    Future Appointments   Date Time Provider Robert Conti   8/11/2022  2:45 PM Brittani Bunn, PT [facility]   8/15/2022  3:30 PM Brittani Bunn, PT [facility]   8/17/2022  3:30 PM Brittani Bunn, PT [facility]   8/23/2022  2:00 PM Brittani , PT [facility]   8/24/2022  3:30 PM Brittani Bunn, PT [facility]   8/29/2022  3:30 PM Brittani Bunn, PT [facility]   8/31/2022  3:30 PM Brittani , PT [facility]   1/3/2023  1:30 PM Jason Connell MD END [facility]

## 2022-08-10 ENCOUNTER — APPOINTMENT (OUTPATIENT)
Dept: PHYSICAL THERAPY | Age: 52
End: 2022-08-10
Payer: MEDICARE

## 2022-08-11 ENCOUNTER — HOSPITAL ENCOUNTER (OUTPATIENT)
Dept: PHYSICAL THERAPY | Age: 52
Setting detail: RECURRING SERIES
Discharge: HOME | End: 2022-08-14
Payer: MEDICARE

## 2022-08-11 PROCEDURE — 97110 THERAPEUTIC EXERCISES: CPT

## 2022-08-11 ASSESSMENT — PAIN SCALES - GENERAL: PAINLEVEL_OUTOF10: 4

## 2022-08-11 NOTE — PROGRESS NOTES
Светлана Daugherty  : 1970  Primary: [address] Medicare Advantage  Secondary:  [address] @ [address] [place], [place] [address]  Phone: [phone]  Fax: [phone] Plan Frequency: 2x/week for 8 weeks  Plan of Care/Certification Expiration Date: 22      PT Visit Info: Total # of Visits to Date: 7      OUTPATIENT PHYSICAL THERAPY:OP NOTE TYPE: Treatment Note 2022       Episode  }Appt Desk             Treatment Diagnosis:  Pain in Left Shoulder (M25.512)  Pain in left hip (M25.552)  Pain in Left Knee (M25.562)  Pain in Right Hip (M25.551)  Medical/Referring Diagnosis:  Right hip impingement syndrome [M25.851]  Chondromalacia of left knee [L69.812]  Referring Physician:  Kanchan Funes MD MD Orders:  PT Eval and Treat   Date of Onset:  No data recorded   Allergies:   Latex, Aspirin, Amoxicillin, Erythromycin, Mometasone, Montelukast, and Petrolatum  Restrictions/Precautions:  No data recordedNo data recorded   Interventions Planned (Treatment may consist of any combination of the following):    Current Treatment Recommendations: Strengthening; ROM; Balance training; Endurance training; Functional mobility training; Stair training; Gait training; Neuromuscular re-education; Manual Therapy - Soft Tissue Mobilization; Manual Therapy - Joint Manipulation; Pain management; Home exercise program; Modalities; Integrated dry needling; Therapeutic activities     Subjective Comments: Pt reports she did better after last visit. Ball rolling on adductor was helpful to manage knee discomfort. She was still very fatigued after last visit. Initial:}    4/10Post Session:       4/10  Medications Last Reviewed:  2022  Updated Objective Findings:  see eval   Treatment   THERAPEUTIC ACTIVITY: (see below for minutes): Therapeutic activities below to improve mobility, strength, balance and coordination.  Required minimal visual, verbal, manual and tactile cues to improve

## 2022-08-15 ENCOUNTER — HOSPITAL ENCOUNTER (OUTPATIENT)
Dept: PHYSICAL THERAPY | Age: 52
Setting detail: RECURRING SERIES
Discharge: HOME | End: 2022-08-18
Payer: MEDICARE

## 2022-08-15 PROCEDURE — 97110 THERAPEUTIC EXERCISES: CPT

## 2022-08-15 ASSESSMENT — PAIN SCALES - GENERAL: PAINLEVEL_OUTOF10: 1

## 2022-08-15 NOTE — PROGRESS NOTES
Jermaine Cooper  : 1970  Primary: [address] Medicare Advantage  [address]:  [address] @ [address] [place], [place] [address]  Phone: [phone]  Fax: [phone] Plan Frequency: 2x/week for 8 weeks  Plan of Care/Certification Expiration Date: 22      PT Visit Info: Total # of Visits to Date: 8      OUTPATIENT PHYSICAL THERAPY:OP NOTE TYPE: Progress Note & Treatment Note 8/15/2022       Episode  }Appt Desk             Treatment Diagnosis:  Pain in Left Shoulder (M25.512)  Pain in left hip (M25.552)  Pain in Left Knee (M25.562)  Pain in Right Hip (M25.551)  Medical/Referring Diagnosis:  Right hip impingement syndrome [M25.851]  Chondromalacia of left knee [P51.736]  Referring Physician:  Carolynn Macdonald MD MD Orders:  PT Eval and Treat   Date of Onset:  No data recorded   Allergies:   Latex, Aspirin, Amoxicillin, Erythromycin, Mometasone, Montelukast, and Petrolatum  Restrictions/Precautions:  No data recordedNo data recorded   Interventions Planned (Treatment may consist of any combination of the following):    Current Treatment Recommendations: Strengthening; ROM; Balance training; Endurance training; Functional mobility training; Stair training; Gait training; Neuromuscular re-education; Manual Therapy - Soft Tissue Mobilization; Manual Therapy - Joint Manipulation; Pain management; Home exercise program; Modalities; Integrated dry needling; Therapeutic activities     Subjective Comments: Pt states she is doing better overall. She is slowly trying to decrease the diclofenac she has been taking (3x per day to BID). She has been able to walk for exercise for the last four nights. L knee is still sore/painful, but overall feels much more manageable. Initial:}    1/10Post Session:       1/10  Medications Last Reviewed:  8/15/2022  Updated Objective Findings:    ROM:   L knee ext to -3, uncomfortable.  Pt rests in slight flexion  Otherwise ROM NT due to high irritability     Strength:   L HS, quad, add grossly 4+/5, dec pain in medial knee, some discomfort in superior patellar region    Shoulder ER: R 4+/5 L 4/5, mild pain     Palpation/Joint Mobility:   *Pes anserine, mild in medial quad  Lateral quad NT due to \"painful lump\" per pt     Special Tests:   NT     Functional Tests/Measures:   WFL gait  Squat grossly WFL, painfree with good knee and hip flexion    Effective Dates: 07/20/22  TO Plan of Care/Certification Expiration Date: 09/20/22   Frequency/Duration: Plan Frequency: 2x/week for 8 weeks   Interventions Planned (Treatment may consist of any combination of the following):    Current Treatment Recommendations: Strengthening; ROM; Balance training; Endurance training; Functional mobility training; Stair training; Gait training; Neuromuscular re-education; Manual Therapy - Soft Tissue Mobilization; Manual Therapy - Joint Manipulation; Pain management; Home exercise program; Modalities; Integrated dry needling; Therapeutic activities   Goals: (Goals have been discussed and agreed upon with patient.)  Short-Term Functional Goals: Time Frame: 4 weeks  Knee extension painfree to >3 deg hyperextension for improved gait - partially met 8/15  Max knee pain improved to 6/10 for improved sleep - met 8/15  Discharge Goals: Time Frame: 8 weeks  Independent with HEP and progression  Demo goblet squat with good form 10# x5 for improved ability to hike   LEFS 45/80 8/15    Treatment   THERAPEUTIC ACTIVITY: (see below for minutes): Therapeutic activities below to improve mobility, strength, balance and coordination. Required minimal visual, verbal, manual and tactile cues to improve independence and safety with daily activities. THERAPEUTIC EXERCISE: (see below for minutes): Exercises below to improve mobility, strength, balance and coordination.  Required minimal verbal and manual cues to promote proper body alignment, promote proper body posture and promote proper body mechanics. Progressed resistance, range, repetitions and complexity of movement as indicated. NEUROMUSCULAR RE-ED: (see below for minutes): Neuromuscular re-education to restore normal body movement patterns. MANUAL THERAPY: (see below for minutes): Joint mobilization and Soft tissue mobilization was utilized and necessary because of the patient's restricted joint motion, painful spasm, loss of articular motion, and restricted motion of soft tissue. MODALITIES: (see below for minutes): to decrease pain and/or swelling  GAIT TRAINING: (see below for minutes): Gait training to improve and/or restore physical functioning as related to mobility, balance and coordination  SELF CARE: (see below for minutes): Self care to improve and/or restore self-care/home management as related to dressing, bathing and grooming. Required minimal verbal cueing to facilitate activities of daily living skills and compensatory activities  MECHANICAL TRACTION: (see below for minutes): Traction was used due to the patient's radiculopathy in order to relieve pain in or originating from the spine. Manual: 8' - held  Gr II ap and inf glide - held  Tennis ball roll adductor (STR)    Exercises/Activities: 36' TE  SL ER   1# L 3x6 with bolster, 1 min break in between - held  Seated ER  Red 3x5, 1 min break in between  Supine punch/flx Red 3x9, 1 min break in between (seated nv?)  SB curl   2x8  SB bridge   3x6, 1 min break in between   DL HR   2x12  Sidestep at high mat 3x mini squat  B stance RDL  2x5 B 5#  Donkey kick  Slow lower - held today    [employer] over roll 30x5\"  Prone TKE  20x5\" with bolster  Cossack squat  Supported at [employer], x10 B  - held  Standing hip add Slider 11 then 8 B  Toe up bridge  3x8, 5\" lower   Standing TKE  20x3\"  Dock swings  1'      HEP and Education:  HEP as above, progression, rationale, monitoring of symptoms  Pt has verbalized and demonstrated compliance with HEP and scheduled appointments. Modalities:  N/a       Treatment/Session Summary:    Treatment Assessment: Patient has made improvements with ROM, pain, strength. Pt continues to have limitations with pain, strength, endurance. Pt will continue to benefit from skilled outpatient Physical Therapy to address these deficits, and return to previous level of function in which the above deficits were not noted. Communication/Consultation:  None today  Equipment provided today:  None  Recommendations/Intent for next treatment session: Next visit will focus on pes retraining, assessment of L shoulder as able.     Total Treatment Billable Duration:  40 minutes  Time In: 1397  Time Out: [address] Zamudio, PT       Charge Capture  }Post Session Pain  PT Visit Info  [employer] Portal  MD Guidelines  Scanned Media  Benefits  MyChart    Future Appointments   Date Time Provider Robert Conti   8/17/2022  3:30 PM Deedee Mutter, PT [facility]   8/23/2022  2:00 PM Deedee Mutter, PT [facility]   8/24/2022  3:30 PM Deedee Mutter, PT [facility]   8/29/2022  3:30 PM Deedee Mutter, PT [facility]   8/31/2022  3:30 PM Deedee Mutter, PT [facility]   9/6/2022  2:00 PM Deedee Mutter, PT [facility]   9/8/2022  2:00 PM Deedee Mutter, PT [facility]   9/13/2022  2:00 PM Deedee Mutter, PT [facility]   9/15/2022  2:00 PM Deedee Mutter, PT [facility]   9/20/2022  2:00 PM Deedee Mutter, PT [facility]   9/22/2022  2:00 PM Deedee Mutter, PT [facility]   9/27/2022  2:00 PM Deedee Mutter, PT [facility]   9/29/2022  2:00 PM Deedee Mutter, PT [facility]   1/3/2023  1:30 PM Kathyleen Koyanagi, MD [facility]

## 2022-08-17 ENCOUNTER — HOSPITAL ENCOUNTER (OUTPATIENT)
Dept: PHYSICAL THERAPY | Age: 52
Setting detail: RECURRING SERIES
Discharge: HOME | End: 2022-08-20
Payer: MEDICARE

## 2022-08-17 PROCEDURE — 97110 THERAPEUTIC EXERCISES: CPT

## 2022-08-17 ASSESSMENT — PAIN SCALES - GENERAL: PAINLEVEL_OUTOF10: 3

## 2022-08-17 NOTE — PROGRESS NOTES
Alisa Perez  : 1970  Primary: [address] Medicare Advantage  Secondary:  [address] @ [address] [place] [address]  Phone: [phone]  Fax: [phone] Plan Frequency: 2x/week for 8 weeks  Plan of Care/Certification Expiration Date: 22      PT Visit Info: Total # of Visits to Date: 9      OUTPATIENT PHYSICAL THERAPY:OP NOTE TYPE: Progress Note & Treatment Note 2022       Episode  }Appt Desk             Treatment Diagnosis:  Pain in Left Shoulder (M25.512)  Pain in left hip (M25.552)  Pain in Left Knee (M25.562)  Pain in Right Hip (M25.551)  Medical/Referring Diagnosis:  Right hip impingement syndrome [M25.851]  Chondromalacia of left knee [Z66.702]  Referring Physician:  Annabelle Elizabeth MD MD Orders:  PT Eval and Treat   Date of Onset:  No data recorded   Allergies:   Latex, Aspirin, Amoxicillin, Erythromycin, Mometasone, Montelukast, and Petrolatum  Restrictions/Precautions:  No data recordedNo data recorded   Interventions Planned (Treatment may consist of any combination of the following):    Current Treatment Recommendations: Strengthening; ROM; Balance training; Endurance training; Functional mobility training; Stair training; Gait training; Neuromuscular re-education; Manual Therapy - Soft Tissue Mobilization; Manual Therapy - Joint Manipulation; Pain management; Home exercise program; Modalities; Integrated dry needling; Therapeutic activities     Subjective Comments: Pt reports overall improvement in energy over the last few weeks. She was able to go on two more strenuous walks in the last two days. Knee is a little more sore/feels tight and swollen after walking on hills yesterday. Initial:}    3/10Post Session:       2/10  Medications Last Reviewed:  2022  Updated Objective Findings:    ROM:   L knee ext to -3, uncomfortable.  Pt rests in slight flexion  Otherwise ROM NT due to high irritability     Strength:   L HS, quad, add grossly 4+/5, dec pain in medial knee, some discomfort in superior patellar region    Shoulder ER: R 4+/5 L 4/5, mild pain     Palpation/Joint Mobility:   *Pes anserine, mild in medial quad  Lateral quad NT due to \"painful lump\" per pt     Special Tests:   NT     Functional Tests/Measures:   WFL gait  Squat grossly WFL, painfree with good knee and hip flexion    Effective Dates: 07/20/22  TO Plan of Care/Certification Expiration Date: 09/20/22   Frequency/Duration: Plan Frequency: 2x/week for 8 weeks   Interventions Planned (Treatment may consist of any combination of the following):    Current Treatment Recommendations: Strengthening; ROM; Balance training; Endurance training; Functional mobility training; Stair training; Gait training; Neuromuscular re-education; Manual Therapy - Soft Tissue Mobilization; Manual Therapy - Joint Manipulation; Pain management; Home exercise program; Modalities; Integrated dry needling; Therapeutic activities   Goals: (Goals have been discussed and agreed upon with patient.)  Short-Term Functional Goals: Time Frame: 4 weeks  Knee extension painfree to >3 deg hyperextension for improved gait - partially met 8/15  Max knee pain improved to 6/10 for improved sleep - met 8/15  Discharge Goals: Time Frame: 8 weeks  Independent with HEP and progression  Demo goblet squat with good form 10# x5 for improved ability to hike   LEFS 45/80 8/15    Treatment   THERAPEUTIC ACTIVITY: (see below for minutes): Therapeutic activities below to improve mobility, strength, balance and coordination. Required minimal visual, verbal, manual and tactile cues to improve independence and safety with daily activities. THERAPEUTIC EXERCISE: (see below for minutes): Exercises below to improve mobility, strength, balance and coordination. Required minimal verbal and manual cues to promote proper body alignment, promote proper body posture and promote proper body mechanics.  Progressed resistance, range, repetitions and complexity of movement as indicated. NEUROMUSCULAR RE-ED: (see below for minutes): Neuromuscular re-education to restore normal body movement patterns. MANUAL THERAPY: (see below for minutes): Joint mobilization and Soft tissue mobilization was utilized and necessary because of the patient's restricted joint motion, painful spasm, loss of articular motion, and restricted motion of soft tissue. MODALITIES: (see below for minutes): to decrease pain and/or swelling  GAIT TRAINING: (see below for minutes): Gait training to improve and/or restore physical functioning as related to mobility, balance and coordination  SELF CARE: (see below for minutes): Self care to improve and/or restore self-care/home management as related to dressing, bathing and grooming. Required minimal verbal cueing to facilitate activities of daily living skills and compensatory activities  MECHANICAL TRACTION: (see below for minutes): Traction was used due to the patient's radiculopathy in order to relieve pain in or originating from the spine. Manual: 8' - held  Gr II ap and inf glide - held  Tennis ball roll adductor (STR)    Exercises/Activities: 36' TE  Deadlift from low mat NV  Self lax roll  3'  SL ER   1# L 3x6 with bolster, 1 min break -held  Seated ER  Red 4x5, 1 min break in between  Seated punch/flx Red 3x5, 1 min break in between  SB curl   2x8  SB bridge   3x6, 1 min break in between   DL HR   2x12  Sidestep at high mat 3x mini squat  B stance RDL  2x5 B 5# - held  [employer] kick  Slow lower - held today    [employer] over roll 30x5\"  Prone TKE  20x5\" with bolster  [place]ossack squat  Supported at [employer], x10 B  - held  Standing hip add Slider 11 then 8 B  Toe up bridge  3x8, 5\" lower   Standing TKE  20x3\"  Dock swings  1'      HEP and Education:  HEP as above, progression, rationale, monitoring of symptoms  Pt has verbalized and demonstrated compliance with HEP and scheduled appointments.      Modalities:  N/a Treatment/Session Summary:    Treatment Assessment: Held on new LQ exercise due to current knee discomfort. Some relief with self lax rolling. Able to progress intensity of UQ band exercise. Communication/Consultation:  None today  Equipment provided today:  None  Recommendations/Intent for next treatment session: Next visit will focus on pes retraining, assessment of L shoulder as able.     Total Treatment Billable Duration:  40 minutes  Time In: 0233  Time Out: [address]udio, PT       Charge Capture  }Post Session Pain  PT Visit Info  [employer] Portal  MD Guidelines  Scanned Media  Benefits  MyChart    Future Appointments   Date Time Provider Robert Conti   8/23/2022  2:00 PM Judith Bard, PT [facility]   8/26/2022  2:45 PM Judith Bard, PT [facility]   8/29/2022  3:30 PM Judith Bard, PT [facility]   8/31/2022  3:30 PM Judith Bard, PT [facility]   9/6/2022  2:00 PM Judith Bard, PT [facility]   9/8/2022  2:00 PM Judith Bard, PT [facility]   9/13/2022  2:00 PM Judith Bard, PT [facility]   9/15/2022  2:00 PM Judith Bard, PT [facility]   9/20/2022  2:00 PM Judith Bard, PT [facility]   9/22/2022  2:00 PM Judith Bard, PT [facility]   9/27/2022  2:00 PM Judith Bard, PT [facility]   9/29/2022  2:00 PM Judith Bard, PT [facility]   1/3/2023  1:30 PM Bi Ramirez MD END [facility]

## 2022-08-23 ENCOUNTER — HOSPITAL ENCOUNTER (OUTPATIENT)
Dept: PHYSICAL THERAPY | Age: 52
Setting detail: RECURRING SERIES
Discharge: HOME | End: 2022-08-26
Payer: MEDICARE

## 2022-08-23 PROCEDURE — 97110 THERAPEUTIC EXERCISES: CPT

## 2022-08-23 ASSESSMENT — PAIN SCALES - GENERAL: PAINLEVEL_OUTOF10: 0

## 2022-08-23 NOTE — PROGRESS NOTES
Amara Espana  : 1970  Primary: [address] Medicare Advantage  Secondary:  [address] @ [address] [place], [place] [address]  Phone: [phone]  Fax: [phone] Plan Frequency: 2x/week for 8 weeks  Plan of Care/Certification Expiration Date: 22      PT Visit Info: Total # of Visits to Date: 10      OUTPATIENT PHYSICAL THERAPY:OP NOTE TYPE: Treatment Note 2022       Episode  }Appt Desk             Treatment Diagnosis:  Pain in Left Shoulder (M25.512)  Pain in left hip (M25.552)  Pain in Left Knee (M25.562)  Pain in Right Hip (M25.551)  Medical/Referring Diagnosis:  Right hip impingement syndrome [M25.851]  Chondromalacia of left knee [F13.740]  Referring Physician:  Celine Kruse MD MD Orders:  PT Eval and Treat   Date of Onset:  No data recorded   Allergies:   Latex, Aspirin, Amoxicillin, Erythromycin, Mometasone, Montelukast, and Petrolatum  Restrictions/Precautions:  No data recordedNo data recorded   Interventions Planned (Treatment may consist of any combination of the following):    Current Treatment Recommendations: Strengthening; ROM; Balance training; Endurance training; Functional mobility training; Stair training; Gait training; Neuromuscular re-education; Manual Therapy - Soft Tissue Mobilization; Manual Therapy - Joint Manipulation; Pain management; Home exercise program; Modalities; Integrated dry needling; Therapeutic activities     Subjective Comments: Pt able to walk for the last 4 nights. Overall she is feeling better but cont to have knee and shoulder pain with certain movements. She has noticed more shoulder pain with movements that require momentum. Initial:}    0/10Post Session:       0/10  Medications Last Reviewed:  2022  Updated Objective Findings:    ROM:   L knee ext to -3, uncomfortable.  Pt rests in slight flexion  Otherwise ROM NT due to high irritability     Strength:   L HS, quad, add grossly 4+/5, dec pain in medial knee, some discomfort in superior patellar region    Shoulder ER: R 4+/5 L 4/5, mild pain     Palpation/Joint Mobility:   *Pes anserine, mild in medial quad  Lateral quad NT due to \"painful lump\" per pt     Special Tests:   NT     Functional Tests/Measures:   WFL gait  Squat grossly WFL, painfree with good knee and hip flexion    Effective Dates: 07/20/22  TO Plan of Care/Certification Expiration Date: 09/20/22   Frequency/Duration: Plan Frequency: 2x/week for 8 weeks   Interventions Planned (Treatment may consist of any combination of the following):    Current Treatment Recommendations: Strengthening; ROM; Balance training; Endurance training; Functional mobility training; Stair training; Gait training; Neuromuscular re-education; Manual Therapy - Soft Tissue Mobilization; Manual Therapy - Joint Manipulation; Pain management; Home exercise program; Modalities; Integrated dry needling; Therapeutic activities   Goals: (Goals have been discussed and agreed upon with patient.)  Short-Term Functional Goals: Time Frame: 4 weeks  Knee extension painfree to >3 deg hyperextension for improved gait - partially met 8/15  Max knee pain improved to 6/10 for improved sleep - met 8/15  Discharge Goals: Time Frame: 8 weeks  Independent with HEP and progression  Demo goblet squat with good form 10# x5 for improved ability to hike   LEFS 45/80 8/15    Treatment   THERAPEUTIC ACTIVITY: (see below for minutes): Therapeutic activities below to improve mobility, strength, balance and coordination. Required minimal visual, verbal, manual and tactile cues to improve independence and safety with daily activities. THERAPEUTIC EXERCISE: (see below for minutes): Exercises below to improve mobility, strength, balance and coordination. Required minimal verbal and manual cues to promote proper body alignment, promote proper body posture and promote proper body mechanics.  Progressed resistance, range, repetitions and complexity of movement as Treatment Assessment: Pt did well with exercise modifications and increased intensity. Discussed ultimate goal of therapy to improve pt independence with HEP. Unfortunately pt's insurance has limited her number of allowed visits; will work to promote independence and progression of walking and hiking ability with POC 1x/week moving forward. Communication/Consultation:  None today  Equipment provided today:  None  Recommendations/Intent for next treatment session: Next visit will focus on pes retraining, assessment of L shoulder as able.     Total Treatment Billable Duration:  45 minutes  Time In: 1400  Time Out: [address]jorie Grier, PT       Charge Capture  }Post Session Pain  PT Visit Info  [employer] Portal  MD Guidelines  Scanned Media  Benefits  [place]    Future Appointments   Date Time Provider Robert Conti   8/31/2022  3:30 PM Brittani Bunn, PT [facility]   9/8/2022  2:00 PM Brittani Bunn, PT [facility]   9/15/2022  2:00 PM Brittani Bunn, PT [facility]   9/22/2022  2:00 PM Brittani Bunn, PT [facility]   9/29/2022  2:00 PM Brittani Bunn, PT [facility]   10/4/2022 11:40 AM DO GHASSAN Lundberg [facility]   1/3/2023  1:30 PM Jason Connell MD END [facility]

## 2022-08-24 ENCOUNTER — APPOINTMENT (OUTPATIENT)
Dept: PHYSICAL THERAPY | Age: 52
End: 2022-08-24
Payer: MEDICARE

## 2022-08-26 ENCOUNTER — APPOINTMENT (OUTPATIENT)
Dept: PHYSICAL THERAPY | Age: 52
End: 2022-08-26
Payer: MEDICARE

## 2022-08-29 ENCOUNTER — APPOINTMENT (OUTPATIENT)
Dept: PHYSICAL THERAPY | Age: 52
End: 2022-08-29
Payer: MEDICARE

## 2022-08-31 ENCOUNTER — HOSPITAL ENCOUNTER (OUTPATIENT)
Dept: PHYSICAL THERAPY | Age: 52
Setting detail: RECURRING SERIES
Discharge: HOME | End: 2022-09-03
Payer: MEDICARE

## 2022-08-31 PROCEDURE — 97140 MANUAL THERAPY 1/> REGIONS: CPT

## 2022-08-31 PROCEDURE — 97110 THERAPEUTIC EXERCISES: CPT

## 2022-08-31 ASSESSMENT — PAIN SCALES - GENERAL: PAINLEVEL_OUTOF10: 5

## 2022-08-31 NOTE — PROGRESS NOTES
irritability     Strength:   L HS, quad, add grossly 4+/5, dec pain in medial knee, some discomfort in superior patellar region    Shoulder ER: R 4+/5 L 4/5, mild pain     Palpation/Joint Mobility:   *Pes anserine, mild in medial quad  Lateral quad NT due to \"painful lump\" per pt     Special Tests:   NT     Functional Tests/Measures:   WFL gait  Squat grossly WFL, painfree with good knee and hip flexion    Effective Dates: 07/20/22  TO Plan of Care/Certification Expiration Date: 09/20/22      Frequency/Duration: Plan Frequency: 2x/week for 8 weeks   Interventions Planned (Treatment may consist of any combination of the0 following):    Current Treatment Recommendations: Strengthening; ROM; Balance training; Endurance training; Functional mobility training; Stair training; Gait training; Neuromuscular re-education; Manual Therapy - Soft Tissue Mobilization; Manual Therapy - Joint Manipulation; Pain management; Home exercise program; Modalities; Integrated dry needling; Therapeutic activities   Goals: (Goals have been discussed and agreed upon with patient.)  Short-Term Functional Goals: Time Frame: 4 weeks  Knee extension painfree to >3 deg hyperextension for improved gait - partially met 8/15  Max knee pain improved to 6/10 for improved sleep - met 8/15  Discharge Goals: Time Frame: 8 weeks  Independent with HEP and progression  Demo goblet squat with good form 10# x5 for improved ability to hike   LEFS 45/80 8/15    Treatment   THERAPEUTIC ACTIVITY: (see below for minutes): Therapeutic activities below to improve mobility, strength, balance and coordination. Required minimal visual, verbal, manual and tactile cues to improve independence and safety with daily activities. THERAPEUTIC EXERCISE: (see below for minutes): Exercises below to improve mobility, strength, balance and coordination.  Required minimal verbal and manual cues to promote proper body alignment, promote proper body posture and promote proper body mechanics. Progressed resistance, range, repetitions and complexity of movement as indicated. NEUROMUSCULAR RE-ED: (see below for minutes): Neuromuscular re-education to restore normal body movement patterns. MANUAL THERAPY: (see below for minutes): Joint mobilization and Soft tissue mobilization was utilized and necessary because of the patient's restricted joint motion, painful spasm, loss of articular motion, and restricted motion of soft tissue. MODALITIES: (see below for minutes): to decrease pain and/or swelling  GAIT TRAINING: (see below for minutes): Gait training to improve and/or restore physical functioning as related to mobility, balance and coordination  SELF CARE: (see below for minutes): Self care to improve and/or restore self-care/home management as related to dressing, bathing and grooming. Required minimal verbal cueing to facilitate activities of daily living skills and compensatory activities  MECHANICAL TRACTION: (see below for minutes): Traction was used due to the patient's radiculopathy in order to relieve pain in or originating from the spine.     Manual: 8'   Gr II ap and inf glide - held  Tennis ball roll adductor (STR) - held  SAD strap R hip, also without strap for HEP      Exercises/Activities: 30' TE  SL ER   3x5  Supine punch/flx 3x5 red  SB curl   2x8  SB bridge   3x8   Education - see below    Deadlift from low mat NV  Step up  Walking skier   SL eccentric HR 2x8 B  Sidestep at high mat 3x mini squat, red at knees 5# at chest  B stance RDL  2x5 B 5#  Donkey kick  Slow lower 2x8 B  HEP review    Self lax roll  3' - held  SL ER   1# L 3x6 with bolster, 1 min break -held  Seated ER  Red 4x5, 1 min break in between  Seated punch/flx Red 3x5, 1 min break in between    [email or web address] over roll 30x5\"  Prone TKE  20x5\" with bolster  Cossack squat  Supported at high mat, x10 B  - held  Standing hip add Slider 11 then 8 B  Toe up bridge  3x8, 5\" lower   Standing TKE  20x3\"  [employer] swings  1'      HEP and Education:  HEP as above, progression, rationale, monitoring of symptoms  Pt has verbalized and demonstrated compliance with HEP and scheduled appointments. Modalities:  N/a       Treatment/Session Summary:    Treatment Assessment: Improved hip/glut pain with light SAD. Taught pt how to perform at home with help from her spouse. Improved shoulder pain with modification of current HEP. Discussed progression of light mobility and control. Also discussed consideration of nerve mediating medications to manage chronic pain; pt to consider looking into this. Communication/Consultation:  None today  Equipment provided today:  None  Recommendations/Intent for next treatment session: Next visit will focus on pes retraining, assessment of L shoulder as able.     Total Treatment Billable Duration:  38 minutes  Time In: 4428  Time Out: [address] PT       Charge Capture  }Post Session Pain  PT Visit Info  [employer] Portal  MD Guidelines  Scanned Media  Benefits  MyChart    Future Appointments   Date Time Provider Robert Conti   9/8/2022  2:45 PM Jessica Beal, PT [facility]   9/15/2022  2:00 PM Jessica Beal, PT [facility]   9/22/2022  2:00 PM Jessica Beal, PT [facility]   9/29/2022  2:00 PM Jessica Beal, PT [facility]   10/4/2022 11:40 AM DO GHASSAN Reyes [facility]   1/3/2023  1:30 PM Edgar Cook MD END [facility]

## 2022-09-06 ENCOUNTER — APPOINTMENT (OUTPATIENT)
Dept: PHYSICAL THERAPY | Age: 52
End: 2022-09-06
Payer: MEDICARE

## 2022-09-08 ENCOUNTER — HOSPITAL ENCOUNTER (OUTPATIENT)
Dept: PHYSICAL THERAPY | Age: 52
Setting detail: RECURRING SERIES
Discharge: HOME | End: 2022-09-11
Payer: MEDICARE

## 2022-09-08 PROCEDURE — 97110 THERAPEUTIC EXERCISES: CPT

## 2022-09-08 ASSESSMENT — PAIN SCALES - GENERAL: PAINLEVEL_OUTOF10: 2

## 2022-09-08 NOTE — PROGRESS NOTES
Velasquez Dhara  : 1970  Primary: [address] Medicare Advantage  Secondary:  [address] @ [id number] [place] [id number]  Phone: [phone]  Fax: [phone] Plan Frequency: 2x/week for 8 weeks  Plan of Care/Certification Expiration Date: 22      PT Visit Info: Total # of Visits to Date: 15      OUTPATIENT PHYSICAL THERAPY:OP NOTE TYPE: Treatment Note 2022       Episode  }Appt Desk             Treatment Diagnosis:  Pain in Left Shoulder (M25.512)  Pain in left hip (M25.552)  Pain in Left Knee (M25.562)  Pain in Right Hip (M25.551)  Medical/Referring Diagnosis:  Right hip impingement syndrome [M25.851]  Chondromalacia of left knee [I39.031]  Referring Physician:  Estela De Anda MD MD Orders:  PT Eval and Treat   Date of Onset:  No data recorded   Allergies:   Latex, Aspirin, Amoxicillin, Erythromycin, Mometasone, Montelukast, and Petrolatum  Restrictions/Precautions:  No data recordedNo data recorded   Interventions Planned (Treatment may consist of any combination of the following):    Current Treatment Recommendations: Strengthening; ROM; Balance training; Endurance training; Functional mobility training; Stair training; Gait training; Neuromuscular re-education; Manual Therapy - Soft Tissue Mobilization; Manual Therapy - Joint Manipulation; Pain management; Home exercise program; Modalities; Integrated dry needling; Therapeutic activities     Subjective Comments: Pt took a few days off after last visit but overall is feeling better. She was able to rest her tablet on her lap without knee pain recently. She will be going out of town for an extended period of time (at least until the end of the month) due to an anticipated death in the family. She plans to return for PT visit on  but will let clinic know if she will still be out of town.       Initial:}    2/10Post Session:       1/10  Medications Last Reviewed:  2022  Updated Objective Findings: ROM:   L knee ext to -3, uncomfortable. Pt rests in slight flexion  Otherwise ROM NT due to high irritability     Strength:   L HS, quad, add grossly 4+/5, dec pain in medial knee, some discomfort in superior patellar region    Shoulder ER: R 4+/5 L 4/5, mild pain     Palpation/Joint Mobility:   *Pes anserine, mild in medial quad  Lateral quad NT due to \"painful lump\" per pt     Special Tests:   NT     Functional Tests/Measures:   WFL gait  Squat grossly WFL, painfree with good knee and hip flexion    Effective Dates: 07/20/22  TO Plan of Care/Certification Expiration Date: 09/20/22      Frequency/Duration: Plan Frequency: 2x/week for 8 weeks   Interventions Planned (Treatment may consist of any combination of the0 following):    Current Treatment Recommendations: Strengthening; ROM; Balance training; Endurance training; Functional mobility training; Stair training; Gait training; Neuromuscular re-education; Manual Therapy - Soft Tissue Mobilization; Manual Therapy - Joint Manipulation; Pain management; Home exercise program; Modalities; Integrated dry needling; Therapeutic activities   Goals: (Goals have been discussed and agreed upon with patient.)  Short-Term Functional Goals: Time Frame: 4 weeks  Knee extension painfree to >3 deg hyperextension for improved gait - partially met 8/15  Max knee pain improved to 6/10 for improved sleep - met 8/15  Discharge Goals: Time Frame: 8 weeks  Independent with HEP and progression  Demo goblet squat with good form 10# x5 for improved ability to hike   LEFS 45/80 8/15    Treatment   THERAPEUTIC ACTIVITY: (see below for minutes): Therapeutic activities below to improve mobility, strength, balance and coordination. Required minimal visual, verbal, manual and tactile cues to improve independence and safety with daily activities. THERAPEUTIC EXERCISE: (see below for minutes): Exercises below to improve mobility, strength, balance and coordination.  Required minimal verbal and manual cues to promote proper body alignment, promote proper body posture and promote proper body mechanics. Progressed resistance, range, repetitions and complexity of movement as indicated. NEUROMUSCULAR RE-ED: (see below for minutes): Neuromuscular re-education to restore normal body movement patterns. MANUAL THERAPY: (see below for minutes): Joint mobilization and Soft tissue mobilization was utilized and necessary because of the patient's restricted joint motion, painful spasm, loss of articular motion, and restricted motion of soft tissue. MODALITIES: (see below for minutes): to decrease pain and/or swelling  GAIT TRAINING: (see below for minutes): Gait training to improve and/or restore physical functioning as related to mobility, balance and coordination  SELF CARE: (see below for minutes): Self care to improve and/or restore self-care/home management as related to dressing, bathing and grooming. Required minimal verbal cueing to facilitate activities of daily living skills and compensatory activities  MECHANICAL TRACTION: (see below for minutes): Traction was used due to the patient's radiculopathy in order to relieve pain in or originating from the spine.     Manual: held  Gr II ap and inf glide - held  Tennis ball roll adductor (STR) - held  SAD strap R hip, also without strap for HEP      Exercises/Activities: 36' TE  HEP discussion, education, progression, rationale - addl time  Walking skier   3x30\"  B stance RDL  2x5 B 5#  Donkey kick  Slow lower 2x8 B    Held  SL eccentric HR 2x8 B  Sidestep at high mat 3x mini squat, red at knees 5# at chest    Held:  SL ER   3x5  Supine punch/flx 3x5 red  SB curl   2x8  SB bridge   3x8   Deadlift from low mat NV  Step up    Self lax roll  3' - held  SL ER   1# L 3x6 with kevyn, 1 min break -held  Seated ER  Red 4x5, 1 min break in between  Seated punch/flx Red 3x5, 1 min break in between    [employer] over roll 30x5\"  Prone TKE  20x5\" with kevyn Day squat  Supported at high mat, x10 B  - held  Standing hip add Slider 11 then 8 B  Toe up bridge  3x8, 5\" lower   Standing TKE  20x3\"  Dock swings  1'      HEP and Education:  HEP as above, progression, rationale, monitoring of symptoms  Pt has verbalized and demonstrated compliance with HEP and scheduled appointments. Modalities:  N/a       Treatment/Session Summary:    Treatment Assessment: Able to initiate a walking skier to better prepare pt for hiking. No pain and excellent form with this B. Had lengthy discussion re: POC, progression, HEP, and plan for ongoing visits when pt returns to [facility]. Pt has not reached all PT goals yet and will benefit from continued therapy, but may need an updated script depending on when she returns and is able to resume PT. Pt indicated understanding and agreement with current plan. Will communicate with clinic/PT with any questions or issues in the meantime. Communication/Consultation:  None today  Equipment provided today:  None  Recommendations/Intent for next treatment session: Next visit will focus on pes retraining, assessment of L shoulder as able.     Total Treatment Billable Duration:  40 minutes  Time In: 7997  Time Out: [address], PT       Charge Capture  }Post Session Pain  PT Visit Info  [employer] Portal  MD Guidelines  Scanned Media  Benefits  MyChart    Future Appointments   Date Time Provider Robert Conti   9/29/2022  2:00 PM Tenisha Nair, PT [facility]   10/4/2022 11:40 AM DO HERO Winston [facility] AM[facility]   1/3/2023  1:30 PM Lena Beyer MD END [facility] AMB

## 2022-09-13 ENCOUNTER — APPOINTMENT (OUTPATIENT)
Dept: PHYSICAL THERAPY | Age: 52
End: 2022-09-13
Payer: MEDICARE

## 2022-09-15 ENCOUNTER — APPOINTMENT (OUTPATIENT)
Dept: PHYSICAL THERAPY | Age: 52
End: 2022-09-15
Payer: MEDICARE

## 2022-09-20 ENCOUNTER — APPOINTMENT (OUTPATIENT)
Dept: PHYSICAL THERAPY | Age: 52
End: 2022-09-20
Payer: MEDICARE

## 2022-09-22 ENCOUNTER — APPOINTMENT (OUTPATIENT)
Dept: PHYSICAL THERAPY | Age: 52
End: 2022-09-22
Payer: MEDICARE

## 2022-09-27 ENCOUNTER — APPOINTMENT (OUTPATIENT)
Dept: PHYSICAL THERAPY | Age: 52
End: 2022-09-27
Payer: MEDICARE

## 2022-09-29 ENCOUNTER — HOSPITAL ENCOUNTER (OUTPATIENT)
Dept: PHYSICAL THERAPY | Age: 52
Setting detail: RECURRING SERIES
Discharge: HOME | End: 2022-10-02
Payer: MEDICARE

## 2022-09-29 PROCEDURE — 97110 THERAPEUTIC EXERCISES: CPT

## 2022-09-29 ASSESSMENT — PAIN SCALES - GENERAL: PAINLEVEL_OUTOF10: 2

## 2022-09-29 NOTE — PROGRESS NOTES
Lahsawn Bliss  : 1970  Primary: [address] Medicare Advantage  Secondary:  [address] @ [facility] [address] [place] [id number]  Phone: [phone]  Fax: [phone] Plan Frequency: 1x/week for 1 week  Plan of Care/Certification Expiration Date: 10/01/22      PT Visit Info: Total # of Visits to Date: 15      OUTPATIENT PHYSICAL THERAPY:OP NOTE TYPE: Treatment Note 2022       Episode  }Appt Desk             Treatment Diagnosis:  Pain in Left Shoulder (M25.512)  Pain in left hip (M25.552)  Pain in Left Knee (M25.562)  Pain in Right Hip (M25.551)  Medical/Referring Diagnosis:  Right hip impingement syndrome [M25.851]  Chondromalacia of left knee [O28.838]  Referring Physician:  Zach Solomon MD MD Orders:  PT Eval and Treat   Date of Onset:  No data recorded   Allergies:   Latex, Aspirin, Amoxicillin, Erythromycin, Mometasone, Montelukast, and Petrolatum  Restrictions/Precautions:  No data recordedNo data recorded   Interventions Planned (Treatment may consist of any combination of the following):    Current Treatment Recommendations: Strengthening; ROM; Balance training; Endurance training; Functional mobility training; Stair training; Gait training; Neuromuscular re-education; Manual Therapy - Soft Tissue Mobilization; Manual Therapy - Joint Manipulation; Pain management; Home exercise program; Modalities; Integrated dry needling; Therapeutic activities     Subjective Comments: See discharge summary      Initial:}    2/10Post Session:       2/10  Medications Last Reviewed:  2022  Updated Objective Findings:  See discharge summary     Treatment   THERAPEUTIC ACTIVITY: (see below for minutes): Therapeutic activities below to improve mobility, strength, balance and coordination. Required minimal visual, verbal, manual and tactile cues to improve independence and safety with daily activities.   THERAPEUTIC EXERCISE: (see below for minutes): Exercises below to improve mobility, strength, balance and coordination. Required minimal verbal and manual cues to promote proper body alignment, promote proper body posture and promote proper body mechanics. Progressed resistance, range, repetitions and complexity of movement as indicated. NEUROMUSCULAR RE-ED: (see below for minutes): Neuromuscular re-education to restore normal body movement patterns. MANUAL THERAPY: (see below for minutes): Joint mobilization and Soft tissue mobilization was utilized and necessary because of the patient's restricted joint motion, painful spasm, loss of articular motion, and restricted motion of soft tissue. MODALITIES: (see below for minutes): to decrease pain and/or swelling  GAIT TRAINING: (see below for minutes): Gait training to improve and/or restore physical functioning as related to mobility, balance and coordination  SELF CARE: (see below for minutes): Self care to improve and/or restore self-care/home management as related to dressing, bathing and grooming. Required minimal verbal cueing to facilitate activities of daily living skills and compensatory activities  MECHANICAL TRACTION: (see below for minutes): Traction was used due to the patient's radiculopathy in order to relieve pain in or originating from the spine.     Manual: held  Gr II ap and inf glide - held  Tennis ball roll adductor (STR) - held  SAD strap R hip, also without strap for HEP      Exercises/Activities: 36' TE  Full review of HEP, progression, frequency - additional time needed including HEP handout    Held  HEP discussion, education, progression, rationale - addl time  Walking skier   3x30\"  B stance RDL  2x5 B 5#  Donkey kick  Slow lower 2x8 B    Held  SL eccentric HR 2x8 B  Sidestep at high mat 3x mini squat, red at knees 5# at chest    Held:  SL ER   3x5  Supine punch/flx 3x5 red  SB curl   2x8  SB bridge   3x8   Deadlift from low mat NV  Step up    Self lax roll  3' - held  SL ER   1# L 3x6 with kevyn, 1 min break -held  Seated ER  Red 4x5, 1 min break in between  Seated punch/flx Red 3x5, 1 min break in between    [employer] set over roll 30x5\"  Prone TKE  20x5\" with bolster  Cossack squat  Supported at high mat, x10 B  - held  Standing hip add Slider 11 then 8 B  Toe up bridge  3x8, 5\" lower   Standing TKE  20x3\"  Dock swings  1'      HEP and Education:  HEP as above, progression, rationale, monitoring of symptoms  Pt has verbalized and demonstrated compliance with HEP and scheduled appointments.      Modalities:  N/a       Treatment/Session Summary:    Treatment Assessment: See discharge summary 9/29     Communication/Consultation:  None today  Equipment provided today:  None  Plan: discharge     Total Treatment Billable Duration:  40 minutes  Time In: 1400  Time Out: [address], PT       Charge Capture  }Post Session Pain  PT Visit Info  [employer] Portal  MD Guidelines  Scanned Media  Benefits  MyChart    Future Appointments   Date Time Provider Robert Conti   1/3/2023  1:30 PM Christiana Andrade MD END [facility]

## 2022-09-29 NOTE — PLAN OF CARE
Josafat Walsh  : 1970  Primary: [address] Medicare Advantage  Secondary:  Nup[facility] Quintero @ [facility] [address] [place] [id number]  Phone: [phone]  Fax: [phone] Plan Frequency: 1x/week for 1 week  Plan of Care/Certification Expiration Date: 10/01/22      PT Visit Info: Total # of Visits to Date: 15      OUTPATIENT PHYSICAL THERAPY:OP NOTE TYPE: Recertification and Discharge Summary 2022               Episode  Appt Desk         Treatment Diagnosis:  Pain in Left Shoulder (M25.512)  Pain in left hip (M25.552)  Pain in Left Knee (M25.562)  Pain in Right Hip (M25.551)  * No diagnoses found *  Medical/Referring Diagnosis:  Right hip impingement syndrome [M25.851]  Chondromalacia of left knee [K61.940]  Referring Physician:  Landon Branham MD MD Orders:  PT Eval and Treat   Return MD Appt:  unknown  Date of Onset:  No data recorded   Allergies:  Latex, Aspirin, Amoxicillin, Erythromycin, Mometasone, Montelukast, and Petrolatum  Restrictions/Precautions:    No data recordedNo data recorded   Medications Last Reviewed:  2022     SUBJECTIVE   History of Injury/Illness (Reason for Referral): Pt presents with long hx of B hip pain and more recent onset of L knee and L shoulder pain around April of this year. She has worked with PT previously on hips and states some improvement overall, but that hips are still bothersome. Her knee was more recently exacerbated by travel and inc walking. The pain is tight/grinding/sharp/shooting in knee and located primarily over pes anserine, but can shift location. She does have systemic mast cell disease which causes inflammatory flareups. Discharge Summary : Pt presents after independent work on HEP for three weeks while out of town. She reports she is feeling really good overall, still with some knee discomfort but able to resume walking program and feels prepared to continue with HEP independently.  She would like to be discharged from PT. Patient Stated Goal(s):  \"walk and sleep without pain\"  Initial:     2/10 Post Session:     2/10  Past Medical History/Comorbidities:   Ms. Jazmyne Barrow  has a past medical history of Anemia, Anxiety and depression, Basal cell carcinoma of lower extremity, GERD (gastroesophageal reflux disease), Hashimoto's thyroiditis, Irritable bowel syndrome, Mitral regurgitation, Orthostatic hypotension, Osteopenia, Pituitary microadenoma ([address] Primary hypothyroidism, Systemic mast cell disease, and Vitamin B12 deficiency. Ms. Jazmyne Barrow  has a past surgical history that includes Endometrial ablation (2009); [place] tooth extraction; malignant skin lesion excision; and Dilation and curettage of uterus. Social History/Living Environment:   Lives With: Spouse  Home Layout: One level     Prior Level of Function/Work/Activity:   Prior level of function: Independent  Occupation: On disability  No data recordedNo data recorded   Learning:   Does the patient/guardian have any barriers to learning?: No barriers     Fall Risk Scale: Solorio Total Score: 0  Solorio Fall Risk: Low (0-24)         OBJECTIVE   ROM:   Knee ROM WFL B, mild discomfort at end ranges L knee    Strength:   L HS, quad, add grossly 4/5 and mild pain throughout    Palpation/Joint Mobility:   *Pes anserine, mild in medial quad (improved)  Lateral quad NT due to \"painful lump\" per pt    Special Tests:   NT    Functional Tests/Measures:   WFL gait  Squat grossly WFL, painfree with good knee and hip flexion      ASSESSMENT   Assessment: Patient has made improvements with ROM, pain, independence which helps with the ability to walking, squatting, household tasks. Pt has met all PT goals and is appropriate for discharge to [facility] at this time. Pt is in agreement with this plan.        PLAN   Effective Dates: 09/29/22  TO Plan of Care/Certification Expiration Date: 10/01/22     Frequency/Duration: Plan Frequency: 1x/week for 1 week     Interventions Planned (Treatment may consist of any combination of the following):    Current Treatment Recommendations: Strengthening; ROM; Balance training; Endurance training; Functional mobility training; Stair training; Gait training; Neuromuscular re-education; Manual Therapy - Soft Tissue Mobilization; Manual Therapy - Joint Manipulation; Pain management; Home exercise program; Modalities; Integrated dry needling; Therapeutic activities     Goals: (Goals have been discussed and agreed upon with patient.)  Short-Term Functional Goals: Time Frame: 4 weeks  Knee extension painfree to >3 deg hyperextension for improved gait  Max knee pain improved to 6/10 for improved sleep  Discharge Goals: Time Frame: 8 weeks  Independent with HEP and progression  Demo goblet squat with good form 10# x5 for improved ability to hike    All met 9/29 (#2 discharge goal met functional portion)         Outcome Measure: Tool Used: Lower Extremity Functional Scale (LEFS)  Score:  Initial: 36/80 Most Recent: 50/80 (Date: 9/29 )   Interpretation of Score: 20 questions each scored on a 5 point scale with 0 representing \"extreme difficulty or unable to perform\" and 4 representing \"no difficulty\". The lower the score, the greater the functional disability. 80/80 represents no disability. Minimal detectable change is 9 points. Total Duration:  Time In: 1400  Time Out: 8366    Regarding Marcos Lynne's therapy, I certify that the treatment plan above will be carried out by a therapist or under their direction. Thank you for this referral,  Kath Valderrama, PT     Referring Physician Signature:  Ernesto Cabello MD _______________________________ Date _____________        Post Session Pain  Charge Capture  PT Visit Info  POC Link  Treatment Note Link  MD Guidelines  MyChart

## 2022-12-28 LAB
T4 FREE SERPL-MCNC: 1.65 NG/DL (ref 0.82–1.77)
TSH SERPL DL<=0.005 MIU/L-ACNC: 0.51 UIU/ML (ref 0.45–4.5)

## 2023-01-03 ENCOUNTER — OFFICE VISIT (OUTPATIENT)
Dept: ENDOCRINOLOGY | Age: 53
End: 2023-01-03
Payer: MEDICARE

## 2023-01-03 VITALS
WEIGHT: 116 LBS | DIASTOLIC BLOOD PRESSURE: 78 MMHG | HEART RATE: 82 BPM | BODY MASS INDEX: 19.01 KG/M2 | SYSTOLIC BLOOD PRESSURE: 118 MMHG | OXYGEN SATURATION: 99 %

## 2023-01-03 DIAGNOSIS — E06.3 HASHIMOTO'S THYROIDITIS: ICD-10-CM

## 2023-01-03 DIAGNOSIS — E03.9 PRIMARY HYPOTHYROIDISM: Primary | ICD-10-CM

## 2023-01-03 DIAGNOSIS — D35.2 PITUITARY MICROADENOMA (HCC): ICD-10-CM

## 2023-01-03 PROCEDURE — 99213 OFFICE O/P EST LOW 20 MIN: CPT | Performed by: INTERNAL MEDICINE

## 2023-01-03 RX ORDER — LEVOTHYROXINE SODIUM 50 MCG
100 TABLET ORAL DAILY
Qty: 180 TABLET | Refills: 3
Start: 2023-01-03

## 2023-01-03 NOTE — PROGRESS NOTES
Joanne Ballesteros MD, [facility]            Reason for visit: Follow-up of hypothyroidism        ASSESSMENT AND PLAN:    1. Primary hypothyroidism  She is now biochemically euthyroid, excluding her thyroid as a source of symptoms. She is concerned that her body temperature may be fluctuating. She is reminded that body temperatures regulated by the hypothalamus, not the endocrine system. Continue Synthroid as prescribed. Return in 6 months with labs. - SYNTHROID 50 MCG tablet; Take 2 tablets by mouth Daily  Dispense: 180 tablet; Refill: 3  - TSH; Future  - T4, Free; Future    2. Hashimoto's thyroiditis    3. Pituitary microadenoma ([facility] [address]  This requires no additional evaluation. Follow-up and Dispositions    Return in about 6 months (around 7/3/2023). History of Present Illness:    THYROID DYSFUNCTION  Loida Nava is seen for follow-up of primary hypothyroidism; this was diagnosed in approximately 1989. She is known to have Hashimoto's thyroiditis. Current symptoms:  See review of systems below    Menstrual/pregnancy history: She is amenorrheic due to prior endometrial ablation. She has no prior pregnancies and no plans for children. Prior treatment: She was started on Synthroid at diagnosis in ~1989. This was discontinued shortly thereafter. Synthroid was resumed in approximately 2003. She had taken 125 mcg (50 mcg tablets x 2.5) daily since December 2015.   Her dose has been adjusted as follows:  -125 mcg daily six days per week and 100 mcg daily one day per week 3/12/2018   -112.5 mcg daily 6/12/2018  -100 mcg daily 3/14/2022     Pertinent labs:  1/17/2012: TSH 4.700, free T4 1.56.  7/18/2012: TSH 3.000, free T4 1.58.  7/19/2013: TSH 2.0, free T4 0.98.  12/12/2013: TSH 3.080, T4 10.9.  8/19/2014: TSH 5.170, T4 9.6, free thyroxine index 2.6.  10/21/2014: TSH 1.390, T4 11.6, free thyroxine index 3.2.  12/22/2014: TSH 0.792, free T4 1.55.  2015: TSH 2.13, T4 11.2, free thyroxine index 3.0.  12/10/2015: TSH 6.070, T4 11.5, free thyroxine index 3.1.  2016: TSH 3.520, T4 10.3, free thyroxine index 2.9.  2016: TSH 1.54, free T4 1.05.  2017: TSH 1.030, free T4 1.57.  2017: TSH 0.493, free T4 1.66.  2018: TSH 0.323, free T4 1.51.  2018: TSH 0.266, free T4 1.61.  2018: TSH 2.570, free T4 1.45.  10/17/2018: TSH 1.560, free T4 1.70.  2019:  TSH 0.573, free T4 1.44.  10/24/2019: TSH 1.100, free T4 1.53.  2020: TSH 0.873, free T4 1.70.  2020: TSH 0.264, free T4 1.62.  3/11/2021: TSH 2.220, free T4 1.26.  2021: TSH 1.200, free T4 1.61.  3/8/2022: TSH 0.149, free T4 1.81, T3 80.  2022: TSH 1.330, free T4 1.54.  2022: TSH 0.512, free T4 1.65. Imagin2015: Ultrasound- 0.4 cm hypoechoic nodule/cyst in the left lobe. 3/12/2018: Ultrasound ([facility])- Right lobe 1.12 x 0.93 x 4.10 cm, isthmus 0.16 cm, left lobe 0.80 x 1.37 x 4.30 cm. Homogeneous echotexture. Normal blood flow. No nodules. PITUITARY DISEASE  Dawson Walker is here for follow-up of a pituitary microadenoma, first noted in approximately . She was previously under the care of an endocrinologist, Dr. Kishore Eisenberg, in [place]. Symptoms: See review of systems below     Menstrual/pregnancy history: She is amenorrheic due to prior endometrial ablation. She has no prior pregnancies and no plans for children. Imagin2017: MRI ([facility])- 5 mm midline pituitary adenoma. Laboratory evaluation:  2012: Cortisol 16, prolactin 12, IGF-I 194, estradiol 87.0, LH 7.7, FSH 9.4.  2012: Prolactin 11, IGF-I 176.  2014: Cortisol 14.8, ACTH 43.6, estradiol 26.7.    2014: Estradiol 90.4, LH 16.7, FSH 11.5.  12/10/2015: Cortisol 18.9, ACTH 68.5, prolactin 18.9, IGF-I 139 estradiol 48.4, LH 4.5, FSH 8.2.  2016: Cortisol 15.1, ACTH 52.7.  2016: Prolactin 14.7. Prior/current treatment: none. She has taken hydrocortisone off and on for treatment of systemic mast cell disease (managed by Dr. Andres Dickson). Review of Systems   Constitutional:  Positive for fatigue (improved). Negative for unexpected weight change (stable). Endocrine: Negative for cold intolerance and heat intolerance. Musculoskeletal:  Positive for arthralgias. Psychiatric/Behavioral:  Positive for sleep disturbance (insomnia improved). /78 (Site: Left Upper Arm, Position: Sitting)   Pulse 82   Wt 116 lb (52.6 kg)   SpO2 99%   BMI 19.01 kg/m²   Wt Readings from Last 3 Encounters:   01/03/23 116 lb (52.6 kg)   03/14/22 117 lb (53.1 kg)   09/20/21 117 lb (53.1 kg)       Physical Exam  Constitutional:       Appearance: Normal appearance. HENT:      Head: Normocephalic. Nose: Nose normal.   Eyes:      Extraocular Movements: Extraocular movements intact. Pulmonary:      Effort: Pulmonary effort is normal.   Musculoskeletal:         General: Normal range of motion. Right shoulder: Normal.      Left shoulder: Normal.      Cervical back: Normal range of motion. Skin:     Coloration: Skin is not jaundiced or pale. Neurological:      General: No focal deficit present. Mental Status: She is alert. Mental status is at baseline.    Psychiatric:         Mood and Affect: Mood normal.         Behavior: Behavior normal.       Orders Placed This Encounter   Procedures    TSH     Standing Status:   Future     Standing Expiration Date:   1/3/2024    T4, Free     Standing Status:   Future     Standing Expiration Date:   1/3/2024         Current Outpatient Medications   Medication Sig Dispense Refill    SYNTHROID 50 MCG tablet Take 2 tablets by mouth Daily 180 tablet 3    diclofenac (VOLTAREN) 25 MG EC tablet Take 25 mg by mouth as needed      Cyanocobalamin (B-12) 100 MCG TABS Place under the tongue      folic acid (FOLVITE) 293 MCG tablet Take 400 mcg by mouth daily      acetaminophen (TYLENOL) 650 MG extended release tablet Take 650 mg by mouth every 6 hours as needed      vitamin D 25 MCG (1000 UT) CAPS Take by mouth daily      clobetasol propionate 0.05 % GEL gel Apply topically as needed      clonazePAM (KLONOPIN) 0.125 MG disintegrating tablet Take 0.125 mg by mouth as needed. cromolyn (NASALCROM) 5.2 MG/ACT nasal spray 1 spray 4 times daily      EPINEPHrine (EPIPEN) 0.3 MG/0.3ML SOAJ injection Inject 0.3 mg into the muscle once as needed      famotidine (PEPCID) 20 MG tablet Take 20 mg by mouth 3 times daily      fexofenadine (ALLEGRA) 180 MG tablet Take 180 mg by mouth 3 times daily      hydrocortisone (CORTEF) 5 MG tablet Take 5 mg by mouth daily as needed      Hydrocortisone Butyrate 0.1 % CREA Apply topically daily      Hyoscyamine Sulfate SL 0.125 MG SUBL Place 0.125 mg under the tongue every 4 hours as needed      zileuton (ZYFLO CR) 600 MG extended release tablet Take 600 mg by mouth       No current facility-administered medications for this visit. Tavon Win MD, FACE      Portions of this note were generated with the assistance of voice recognition software. As such, some errors in transcription may be present.

## 2023-03-05 DIAGNOSIS — E03.9 PRIMARY HYPOTHYROIDISM: ICD-10-CM

## 2023-03-06 RX ORDER — LEVOTHYROXINE SODIUM 50 MCG
TABLET ORAL
Qty: 180 TABLET | Refills: 0 | OUTPATIENT
Start: 2023-03-06

## 2023-03-26 DIAGNOSIS — E03.9 PRIMARY HYPOTHYROIDISM: ICD-10-CM

## 2023-03-27 RX ORDER — LEVOTHYROXINE SODIUM 50 MCG
100 TABLET ORAL DAILY
Qty: 180 TABLET | Refills: 3 | Status: SHIPPED | OUTPATIENT
Start: 2023-03-27

## 2023-07-11 LAB
T4 FREE SERPL-MCNC: 1.49 NG/DL (ref 0.82–1.77)
TSH SERPL DL<=0.005 MIU/L-ACNC: 1.97 UIU/ML (ref 0.45–4.5)

## 2023-07-12 ENCOUNTER — TELEMEDICINE (OUTPATIENT)
Dept: ENDOCRINOLOGY | Age: 53
End: 2023-07-12
Payer: MEDICARE

## 2023-07-12 DIAGNOSIS — D35.2 PITUITARY MICROADENOMA (HCC): ICD-10-CM

## 2023-07-12 DIAGNOSIS — E03.9 PRIMARY HYPOTHYROIDISM: Primary | ICD-10-CM

## 2023-07-12 DIAGNOSIS — E06.3 HASHIMOTO'S THYROIDITIS: ICD-10-CM

## 2023-07-12 PROCEDURE — 99213 OFFICE O/P EST LOW 20 MIN: CPT | Performed by: INTERNAL MEDICINE

## 2023-07-12 RX ORDER — LEVOTHYROXINE SODIUM 50 MCG
100 TABLET ORAL DAILY
Qty: 180 TABLET | Refills: 3
Start: 2023-07-12

## 2023-07-12 NOTE — PROGRESS NOTES
Lexus Alarcon MD, [facility]            Reason for visit: Follow-up of hypothyroidism    I was in the office while conducting this encounter. Consent:  Bry Espana, who was evaluated through a synchronous (real-time) audio-video encounter, and/or her healthcare decision maker, is aware that it is a billable service, which includes applicable co-pays, with coverage as determined by her insurance carrier. She provided verbal consent to proceed and patient identification was verified. This visit was conducted pursuant to the emergency declaration under the [place] [id number] authority and the [employer] Act. A caregiver was present when appropriate. Ability to conduct physical exam was limited. The patient was located at home in a state where the provider was licensed to provide care. This virtual visit was conducted via [address] [place] [address]. Pursuant to the emergency declaration under the [place] [address] [facility] authority and the [employer] Act, this Virtual  Visit was conducted to reduce the patient's risk of exposure to COVID-19 and provide continuity of care for an established patient. Services were provided through a video synchronous discussion virtually to substitute for in-person clinic visit. Due to this being a TeleHealth evaluation, many elements of the physical examination are unable to be assessed. --Cordell Lui MD on 7/12/2023 at 1:31 PM          ASSESSMENT AND PLAN:    1. Primary hypothyroidism  She is now biochemically euthyroid, excluding her thyroid as a source of symptoms. SContinue Synthroid as prescribed. Return in 6 months with labs. - SYNTHROID 50 MCG tablet; Take 2 tablets by mouth Daily  Dispense: 180 tablet; Refill: 3  - TSH;  Future  - T4,

## 2023-08-16 ENCOUNTER — HOSPITAL ENCOUNTER (OUTPATIENT)
Dept: PHYSICAL THERAPY | Age: 53
Setting detail: RECURRING SERIES
Discharge: HOME | End: 2023-08-19
Payer: MEDICARE

## 2023-08-16 PROCEDURE — 97162 PT EVAL MOD COMPLEX 30 MIN: CPT

## 2023-08-16 NOTE — THERAPY EVALUATION
Christine Gtz  : 1970  Primary: [address] [facility] Medicare Advantage (Medicare Managed)  Secondary:  [address] @ [address] [address]  Phone: [phone]  Fax: [phone]    Plan of Care/Certification Expiration Date: 10/16/23      PT Visit Info:  Plan of Care/Certification Expiration Date: 10/16/23      Visit Count:  1                OUTPATIENT PHYSICAL THERAPY:             OP NOTE TYPE: Initial Assessment 2023               Episode (PT thoracic pain) Appt Desk         Treatment Diagnosis:  Pain in Left Knee (M25.562)  Difficulty in walking, Not elsewhere classified (R26.2)  Pain in thoracic spine (M54.6)  Medical/Referring Diagnosis:  Chronic bilateral thoracic back pain [M54.6, G89.29]  Chronic pain of left knee [T67.724, G89.29]  Referring Physician:  Bong Nelson DO MD Orders:  PT Eval and Treat   Return MD Appt:  PRN  Date of Onset:       Allergies:  Latex, Aspirin, Amoxicillin, Erythromycin, Mometasone, Montelukast, and Petrolatum  Restrictions/Precautions:           Medications Last Reviewed:  2023     SUBJECTIVE   History of Injury/Illness (Reason for Referral):  Francesca Mccoy presents with chronic mid back pain that is worst when she performs tasks with B Ues at or above shoulder height. Some of these activities include folding clothes, washing dishes, and holding a camera up to take photos. She does report recurring L knee pain as well. Patient Stated Goal(s):  \"improve pain\"  Initial:     4/10 Post Session:     2/10  Past Medical History/Comorbidities:   Ms. Libby Joseph  has a past medical history of Anemia, Anxiety and depression, Basal cell carcinoma of lower extremity, GERD (gastroesophageal reflux disease), Hashimoto's thyroiditis, Irritable bowel syndrome, Mitral regurgitation, Orthostatic hypotension, Osteopenia, Pituitary microadenoma ([address]), Primary hypothyroidism, Systemic mast cell disease, and Vitamin B12 deficiency.   Ms. Libby Joseph  has a past

## 2023-08-17 ASSESSMENT — PAIN SCALES - GENERAL: PAINLEVEL_OUTOF10: 4

## 2023-08-17 NOTE — PROGRESS NOTES
MD Guidelines  Scanned Media  Benefits  MyChart    Future Appointments   Date Time Provider [address]   8/22/2023  8:45 AM Conchita Wagner, PT [facility]   8/24/2023  3:30 PM Conchita Wagner PT [facility]   8/30/2023 10:15 AM Mary Grace Christie PT [facility]

## 2023-08-22 ENCOUNTER — HOSPITAL ENCOUNTER (OUTPATIENT)
Dept: PHYSICAL THERAPY | Age: 53
Setting detail: RECURRING SERIES
Discharge: HOME | End: 2023-08-25
Payer: MEDICARE

## 2023-08-22 PROCEDURE — 97140 MANUAL THERAPY 1/> REGIONS: CPT

## 2023-08-22 PROCEDURE — 97110 THERAPEUTIC EXERCISES: CPT

## 2023-08-22 ASSESSMENT — PAIN SCALES - GENERAL: PAINLEVEL_OUTOF10: 2

## 2023-08-22 NOTE — PROGRESS NOTES
Tae Other  : 1970  Primary: [address] [facility] Medicare Advantage (Medicare Managed)  Secondary:  [address] @ [address] [address]  Phone: [phone]  Fax: [phone]    Plan of Care/Certification Expiration Date: 10/16/23      >PT Visit Info:  Plan of Care/Certification Expiration Date: 10/16/23      Visit Count:  2    OUTPATIENT PHYSICAL THERAPY:OP NOTE TYPE: OP Note Type: Treatment Note 2023       Episode  }Appt Desk             Treatment Diagnosis:  Pain in Left Knee (M25.562)  Difficulty in walking, Not elsewhere classified (R26.2)  Pain in thoracic spine (M54.6)  Medical/Referring Diagnosis:  Chronic bilateral thoracic back pain [M54.6, G89.29]  Chronic pain of left knee [Z16.000, G89.29]  Referring Physician:  Clarke Coates DO MD Orders:  PT Eval and Treat   Date of Onset:  No data recorded   Allergies:   Latex, Aspirin, Amoxicillin, Erythromycin, Mometasone, Montelukast, and Petrolatum  Restrictions/Precautions:  No data recordedNo data recorded     Interventions Planned (Treatment may consist of any combination of the following):    Current Treatment Recommendations: Strengthening; ROM; Endurance training; Balance training; Gait training; Stair training; Neuromuscular re-education; Manual; Pain management; Home exercise program; Dry needling; Positioning; Therapeutic activities     >Subjective Comments Pt states she has had some pain in L abdomen over the last few weeks. Feels related to muscle use and posture. >Initial:     210>Post Session:       0/10  Medications Last Reviewed:  2023  Updated Objective Findings:  see evaluation  Treatment   THERAPEUTIC ACTIVITY: (see below for minutes): Therapeutic activities below to improve mobility, strength, balance and coordination. Required minimal visual, verbal, manual and tactile cues to improve independence and safety with daily activities.   THERAPEUTIC EXERCISE: (see below for minutes):

## 2023-08-24 ENCOUNTER — HOSPITAL ENCOUNTER (OUTPATIENT)
Dept: PHYSICAL THERAPY | Age: 53
Setting detail: RECURRING SERIES
Discharge: HOME | End: 2023-08-27
Payer: MEDICARE

## 2023-08-24 PROCEDURE — 97110 THERAPEUTIC EXERCISES: CPT

## 2023-08-24 PROCEDURE — 97140 MANUAL THERAPY 1/> REGIONS: CPT

## 2023-08-24 ASSESSMENT — PAIN SCALES - GENERAL: PAINLEVEL_OUTOF10: 4

## 2023-08-24 NOTE — PROGRESS NOTES
Korina Mcgraw  : 1970  Primary: [address] [facility] Medicare Advantage (Medicare Managed)  Secondary:  [address] @ [address] [address]  Phone: [phone]  Fax: [phone]    Plan of Care/Certification Expiration Date: 10/16/23      >PT Visit Info:  Plan of Care/Certification Expiration Date: 10/16/23      Visit Count:  3    OUTPATIENT PHYSICAL THERAPY:OP NOTE TYPE: OP Note Type: Treatment Note 2023       Episode  }Appt Desk             Treatment Diagnosis:  Pain in Left Knee (M25.562)  Difficulty in walking, Not elsewhere classified (R26.2)  Pain in thoracic spine (M54.6)  Medical/Referring Diagnosis:  Chronic bilateral thoracic back pain [M54.6, G89.29]  Chronic pain of left knee [Y60.577, G89.29]  Referring Physician:  Tatiana Cruz DO MD Orders:  PT Eval and Treat   Date of Onset:  No data recorded   Allergies:   Latex, Aspirin, Amoxicillin, Erythromycin, Mometasone, Montelukast, and Petrolatum  Restrictions/Precautions:  No data recordedNo data recorded     Interventions Planned (Treatment may consist of any combination of the following):    Current Treatment Recommendations: Strengthening; ROM; Endurance training; Balance training; Gait training; Stair training; Neuromuscular re-education; Manual; Pain management; Home exercise program; Dry needling; Positioning; Therapeutic activities     >Subjective Comments Pt very sore/painful after last visit in thoracic spine. She feels like exercises were a little too much. She did take a steroid this AM which she keeps on hand for flareups. >Initial:     4/10>Post Session:       2/10  Medications Last Reviewed:  2023  Updated Objective Findings:  see evaluation  Treatment   THERAPEUTIC ACTIVITY: (see below for minutes): Therapeutic activities below to improve mobility, strength, balance and coordination.  Required minimal visual, verbal, manual and tactile cues to improve independence and safety with daily

## 2023-08-30 ENCOUNTER — HOSPITAL ENCOUNTER (OUTPATIENT)
Dept: PHYSICAL THERAPY | Age: 53
Setting detail: RECURRING SERIES
Discharge: HOME | End: 2023-09-02
Payer: MEDICARE

## 2023-08-30 PROCEDURE — 97110 THERAPEUTIC EXERCISES: CPT

## 2023-08-30 ASSESSMENT — PAIN SCALES - GENERAL: PAINLEVEL_OUTOF10: 2

## 2023-08-30 NOTE — PROGRESS NOTES
Edward Ramos  : 1970  Primary: [address] [facility] Medicare Advantage (Medicare Managed)  Secondary:  [address] @ [address] [address]  Phone: [phone]  Fax: [phone]    Plan of Care/Certification Expiration Date: 10/16/23      >PT Visit Info:  Plan of Care/Certification Expiration Date: 10/16/23      Visit Count:  4    OUTPATIENT PHYSICAL THERAPY:OP NOTE TYPE: OP Note Type: Treatment Note 2023       Episode  }Appt Desk             Treatment Diagnosis:  Pain in Left Knee (M25.562)  Difficulty in walking, Not elsewhere classified (R26.2)  Pain in thoracic spine (M54.6)  Medical/Referring Diagnosis:  Chronic bilateral thoracic back pain [M54.6, G89.29]  Chronic pain of left knee [A68.306, G89.29]  Referring Physician:  Josepha Severe, DO MD Orders:  PT Eval and Treat   Date of Onset:  No data recorded   Allergies:   Latex, Aspirin, Amoxicillin, Erythromycin, Mometasone, Montelukast, and Petrolatum  Restrictions/Precautions:  No data recordedNo data recorded     Interventions Planned (Treatment may consist of any combination of the following):    Current Treatment Recommendations: Strengthening; ROM; Endurance training; Balance training; Gait training; Stair training; Neuromuscular re-education; Manual; Pain management; Home exercise program; Dry needling; Positioning; Therapeutic activities     >Subjective Comments    patient reports she has problems with her R ankle, her thoracic spine, and her left knee. She would like to work on thoracic spine today  >Initial:     2 (\"1 or 2\")/10>Post Session:       2 (\"1 or 2\")/10  Medications Last Reviewed:  2023  Updated Objective Findings:  No new  Treatment   THERAPEUTIC ACTIVITY: (see below for minutes): Therapeutic activities below to improve mobility, strength, balance and coordination. Required minimal visual, verbal, manual and tactile cues to improve independence and safety with daily activities.   THERAPEUTIC

## 2023-09-05 ENCOUNTER — HOSPITAL ENCOUNTER (OUTPATIENT)
Dept: PHYSICAL THERAPY | Age: 53
Setting detail: RECURRING SERIES
Discharge: HOME | End: 2023-09-08
Payer: MEDICARE

## 2023-09-05 PROCEDURE — 97110 THERAPEUTIC EXERCISES: CPT

## 2023-09-05 ASSESSMENT — PAIN SCALES - GENERAL: PAINLEVEL_OUTOF10: 4

## 2023-09-05 NOTE — PROGRESS NOTES
Chen Cee  : 1970  Primary: [address] [facility] Medicare Advantage (Medicare Managed)  Secondary:  [address] @ [address] [address]  Phone: [phone]  Fax: [phone]    Plan of Care/Certification Expiration Date: 10/16/23      >PT Visit Info:  Plan of Care/Certification Expiration Date: 10/16/23      Visit Count:  5    OUTPATIENT PHYSICAL THERAPY:OP NOTE TYPE: OP Note Type: Treatment Note 2023       Episode  }Appt Desk             Treatment Diagnosis:  Pain in Left Knee (M25.562)  Difficulty in walking, Not elsewhere classified (R26.2)  Pain in thoracic spine (M54.6)  Medical/Referring Diagnosis:  Chronic bilateral thoracic back pain [M54.6, G89.29]  Chronic pain of left knee [E39.549, G89.29]  Referring Physician:  Danay Mullins DO MD Orders:  PT Eval and Treat   Date of Onset:  No data recorded   Allergies:   Latex, Aspirin, Amoxicillin, Erythromycin, Mometasone, Montelukast, and Petrolatum  Restrictions/Precautions:  No data recordedNo data recorded     Interventions Planned (Treatment may consist of any combination of the following):    Current Treatment Recommendations: Strengthening; ROM; Endurance training; Balance training; Gait training; Stair training; Neuromuscular re-education; Manual; Pain management; Home exercise program; Dry needling; Positioning; Therapeutic activities     >Subjective Comments    patient reports she did okay for a while after last time, but then she cooked for a while over the weekend and pain went up to 8.  >Initial:     4 (\"3 to 4\")/10>Post Session:        (\"Hard to describe\")/10  Medications Last Reviewed:  2023  Updated Objective Findings:  No new  Treatment   THERAPEUTIC ACTIVITY: (see below for minutes): Therapeutic activities below to improve mobility, strength, balance and coordination. Required minimal visual, verbal, manual and tactile cues to improve independence and safety with daily activities.   THERAPEUTIC

## 2023-09-07 ENCOUNTER — HOSPITAL ENCOUNTER (OUTPATIENT)
Dept: PHYSICAL THERAPY | Age: 53
Setting detail: RECURRING SERIES
Discharge: HOME | End: 2023-09-10
Payer: MEDICARE

## 2023-09-07 PROCEDURE — 97140 MANUAL THERAPY 1/> REGIONS: CPT

## 2023-09-07 NOTE — PROGRESS NOTES
band - red 2x10  B shldr extn with band - red 2x10    Cat stretch 2x10 - held  SL TS rotation (without head movement) x10 B - held  Seated IR iso with bolster 10x10\"  - held  Dead bug 4x9 - held today  Pallof press 3x5 red B with diaphragmatic breath - held   Education - see below    HEP and Education:  Continue current HEP    Modalities:  N/a      Treatment/Session Summary:    >Treatment Assessment:    patient noted feeling better when she left than when she got here. She plans to try and get an ortho appointment. Communication/Consultation:  None today  Equipment provided today:  None  Recommendations/Intent for next treatment session: Next visit will focus on progressive thoracic mobility and strength.     >Total Treatment Billable Duration:  25 minutes  Time In: 0930  Time Out: 0957    Judge Alpers, PT       Charge Capture  }Post Session Pain  PT Visit Info  [employer] Portal  MD Guidelines  Scanned Media  Benefits  MyChart    Future Appointments   Date Time Provider [address]   9/13/2023 10:15 AM Alexandre Garcia PTA [facility]   9/27/2023 11:45 AM AUBREY GAMBLE ATC [facility]

## 2023-09-13 ENCOUNTER — HOSPITAL ENCOUNTER (OUTPATIENT)
Dept: PHYSICAL THERAPY | Age: 53
Setting detail: RECURRING SERIES
End: 2023-09-13
Payer: MEDICARE

## 2023-09-13 NOTE — PROGRESS NOTES
Shaun Mcmillan  : 1970  Primary: [address] Medicare Advantage  Secondary:  [address] @ [address] [address]  Phone: [phone]  Fax: [phone] Plan Frequency: 1x/week for 1 week    Plan of Care/Certification Expiration Date: 10/16/23      PT Visit Info: Total # of Visits to Date: [address] THERAPY 2023     Appt Desk   Episode   MyChart      Ms. Jean-Claude Panda was a cancellation. She gave > 24 hr notice.       Michael Bradley PTA    Future Appointments   Date Time Provider [address]   2023  7:00 PM Michael Bradley PTA [facility]   2023 11:45 AM Rich Polanco PT [facility]

## 2023-09-15 ENCOUNTER — APPOINTMENT (OUTPATIENT)
Dept: PHYSICAL THERAPY | Age: 53
End: 2023-09-15
Payer: MEDICARE

## 2023-09-18 ENCOUNTER — APPOINTMENT (OUTPATIENT)
Dept: PHYSICAL THERAPY | Age: 53
End: 2023-09-18
Payer: MEDICARE

## 2023-09-20 ENCOUNTER — APPOINTMENT (OUTPATIENT)
Dept: PHYSICAL THERAPY | Age: 53
End: 2023-09-20
Payer: MEDICARE

## 2023-09-25 ENCOUNTER — APPOINTMENT (OUTPATIENT)
Dept: PHYSICAL THERAPY | Age: 53
End: 2023-09-25
Payer: MEDICARE

## 2023-09-27 ENCOUNTER — HOSPITAL ENCOUNTER (OUTPATIENT)
Dept: PHYSICAL THERAPY | Age: 53
Setting detail: RECURRING SERIES
Discharge: HOME | End: 2023-09-30
Payer: MEDICARE

## 2023-09-27 PROCEDURE — 97110 THERAPEUTIC EXERCISES: CPT

## 2023-09-27 NOTE — PROGRESS NOTES
(see below for minutes): Therapeutic activities below to improve mobility, strength, balance and coordination. Required minimal visual, verbal, manual and tactile cues to improve independence and safety with daily activities. THERAPEUTIC EXERCISE: (see below for minutes): Exercises below to improve mobility, strength, balance and coordination. Required minimal verbal and manual cues to promote proper body alignment, promote proper body posture and promote proper body mechanics. Progressed resistance, range, repetitions and complexity of movement as indicated. NEUROMUSCULAR RE-ED: (see below for minutes): Neuromuscular re-education to restore normal body movement patterns. MANUAL THERAPY: (see below for minutes): Joint mobilization and Soft tissue mobilization was utilized and necessary because of the patient's restricted joint motion, painful spasm, loss of articular motion, and restricted motion of soft tissue. MODALITIES: (see below for minutes): to decrease pain and/or swelling  GAIT TRAINING: (see below for minutes): Gait training to improve and/or restore physical functioning as related to mobility, balance and coordination  SELF CARE: (see below for minutes): Self care to improve and/or restore self-care/home management as related to dressing, bathing and grooming. Required minimal verbal cueing to facilitate activities of daily living skills and compensatory activities  MECHANICAL TRACTION: (see below for minutes): Traction was used due to the patient's radiculopathy in order to relieve pain in or originating from the spine. Manual: (0 minutes)  STM of L thoracic paraspinals with patient seated on table, and sheet covering the table to prevent patient contact with cleaning solution on table. (Not completed today)    Exercises/Activities:44 minutes    Seated slouch to overcorrect with posture education: 15x  Seated thoracic ext: 2 x 10    Seated slouch to overcorrect 10 x.  Education on proper sitting

## 2023-10-05 ENCOUNTER — HOSPITAL ENCOUNTER (OUTPATIENT)
Dept: PHYSICAL THERAPY | Age: 53
Setting detail: RECURRING SERIES
Discharge: HOME | End: 2023-10-08
Payer: MEDICARE

## 2023-10-05 PROCEDURE — 97110 THERAPEUTIC EXERCISES: CPT

## 2023-10-05 NOTE — PROGRESS NOTES
balance and coordination. Required minimal visual, verbal, manual and tactile cues to improve independence and safety with daily activities. THERAPEUTIC EXERCISE: (see below for minutes): Exercises below to improve mobility, strength, balance and coordination. Required minimal verbal and manual cues to promote proper body alignment, promote proper body posture and promote proper body mechanics. Progressed resistance, range, repetitions and complexity of movement as indicated. NEUROMUSCULAR RE-ED: (see below for minutes): Neuromuscular re-education to restore normal body movement patterns. MANUAL THERAPY: (see below for minutes): Joint mobilization and Soft tissue mobilization was utilized and necessary because of the patient's restricted joint motion, painful spasm, loss of articular motion, and restricted motion of soft tissue. MODALITIES: (see below for minutes): to decrease pain and/or swelling  GAIT TRAINING: (see below for minutes): Gait training to improve and/or restore physical functioning as related to mobility, balance and coordination  SELF CARE: (see below for minutes): Self care to improve and/or restore self-care/home management as related to dressing, bathing and grooming. Required minimal verbal cueing to facilitate activities of daily living skills and compensatory activities  MECHANICAL TRACTION: (see below for minutes): Traction was used due to the patient's radiculopathy in order to relieve pain in or originating from the spine. Manual: (0 minutes)  STM of L thoracic paraspinals with patient seated on table, and sheet covering the table to prevent patient contact with cleaning solution on table. (Not completed today)    Exercises/Activities:39 minutes    Standing green ball press down into table 2 x 15 with 3 second holds    Seated slouch to overcorrect 10 x.    Scapular retraction with band - red 2x10  B shldr extn with band - red 2x10    Seated TS rotation 3 x10 B   Pallof press 2 x 10

## 2023-10-18 ENCOUNTER — HOSPITAL ENCOUNTER (OUTPATIENT)
Dept: PHYSICAL THERAPY | Age: 53
Setting detail: RECURRING SERIES
End: 2023-10-18
Payer: MEDICARE

## 2023-10-18 NOTE — PROGRESS NOTES
Visit was canceled due to pending insurance authorization to extend dates and request further visits.

## 2023-10-25 ENCOUNTER — HOSPITAL ENCOUNTER (OUTPATIENT)
Dept: PHYSICAL THERAPY | Age: 53
Setting detail: RECURRING SERIES
Discharge: HOME | End: 2023-10-28
Payer: MEDICARE

## 2023-10-25 PROCEDURE — 97110 THERAPEUTIC EXERCISES: CPT

## 2023-10-25 PROCEDURE — 97140 MANUAL THERAPY 1/> REGIONS: CPT

## 2023-10-25 NOTE — PROGRESS NOTES
stretching    Standing green ball press down into table 2 x 15 with 3 second holds    Seated slouch to overcorrect 10 x. Scapular retraction with band - red 2x10  B shldr extn with band - red 2x10    Seated TS rotation 3 x10 B   Pallof press 2 x 10 red B with diaphragmatic breath       Modalities:   N/a      Goals: (Goals have been discussed and agreed upon with patient.)  Short-Term Functional Goals: Time Frame: 2 weeks  Independent with HEP (Progressed 9/27/23)  Thoracic rotation painfree B for improved ability to turn (Ongoing - minimal pain)  Discharge Goals: Time Frame: 8 weeks  ER/IR strength improved to 4/5 for improved ability to use UE for tasks (MET)  Max pain improved to 2/10 for improved ability to take photos during recreational activities. (Ongoing)          Outcome Measure: Tool Used: Modified Oswestry Low Back Pain Questionnaire  Score:  Initial: 13/50  Most Recent: 19/50(Date:9/27/23 )   Interpretation of Score: Each section is scored on a 0-5 scale, 5 representing the greatest disability. The scores of each section are added together for a total score of 50. Treatment/Session Summary:    >Treatment Assessment:  Pt tolerated needling in prone and was educated on what to expect and was given 2 exercises as above for HEP. Continue with needling for another 2 visits. Pt felt much better following treatment and stated she felt like she could breath and move better especially the L shoulder. Communication/Consultation:  None today  Equipment provided today:  None today  Recommendations/Intent for next treatment session: Next visit will focus on progressive thoracic mobility and strength.     >Total Treatment Billable Duration:  45 minutes  Time In: 0200  Time Out: [address], PT       Charge Capture  }Post Session Pain  PT Visit Info  [facility] Portal  MD Guidelines  Scanned Media  Benefits  MyChart    Future Appointments   Date Time Provider [address]   11/1/2023 11:45

## 2023-11-01 ENCOUNTER — HOSPITAL ENCOUNTER (OUTPATIENT)
Dept: PHYSICAL THERAPY | Age: 53
Setting detail: RECURRING SERIES
Discharge: HOME | End: 2023-11-04
Payer: MEDICARE

## 2023-11-01 PROCEDURE — 97110 THERAPEUTIC EXERCISES: CPT

## 2023-11-01 PROCEDURE — 97140 MANUAL THERAPY 1/> REGIONS: CPT

## 2023-11-01 NOTE — PROGRESS NOTES
Merly Lindo  : 1970  Primary: [facility] Medicare Advantage (Medicare Managed)  Secondary:  [address] @ [address] [address]  Phone: [phone]  Fax: [phone]    Plan of Care/Certification Expiration Date: 10/16/23      >PT Visit Info:  Plan of Care/Certification Expiration Date: 10/16/23      Visit Count:  10    OUTPATIENT PHYSICAL THERAPY:OP NOTE TYPE: Treatment Note 2023       Episode  }Appt Desk             Treatment Diagnosis:  Pain in Left Knee (M25.562)  Difficulty in walking, Not elsewhere classified (R26.2)  Pain in thoracic spine (M54.6)  Medical/Referring Diagnosis:  Chronic bilateral thoracic back pain [M54.6, G89.29]  Chronic pain of left knee [C79.206, G89.29]  Referring Physician:  Simi Mcdonald DO MD Orders:  PT Eval and Treat   Date of Onset:  No data recorded   Allergies:   Latex, Aspirin, Amoxicillin, Erythromycin, Mometasone, Montelukast, and Petrolatum  Restrictions/Precautions:  No data recordedNo data recorded     Interventions Planned (Treatment may consist of any combination of the following):    Current Treatment Recommendations: Strengthening; ROM; Endurance training; Balance training; Gait training; Stair training; Neuromuscular re-education; Manual; Pain management; Home exercise program; Dry needling; Positioning; Therapeutic activities     >Subjective Comments \"I felt much better after last time and it really lasted until about today. \"      >Initial:      -3/10>Post Session:        -3/10   Medications Last Reviewed:  2023  Updated Objective Findings:      23:    B UE strength /5. B LE strength /5. B shoulder ROM WNL  Cervical ROM WNL: avoided R lateral flexion due to history of vertigo for 18 years ago  Thoracic spine ROM WNL. Treatment   THERAPEUTIC ACTIVITY: (see below for minutes): Therapeutic activities below to improve mobility, strength, balance and coordination.  Required minimal visual,

## 2023-11-08 ENCOUNTER — HOSPITAL ENCOUNTER (OUTPATIENT)
Dept: PHYSICAL THERAPY | Age: 53
Setting detail: RECURRING SERIES
End: 2023-11-08
Payer: MEDICARE

## 2023-11-09 ENCOUNTER — HOSPITAL ENCOUNTER (OUTPATIENT)
Dept: PHYSICAL THERAPY | Age: 53
Setting detail: RECURRING SERIES
Discharge: HOME | End: 2023-11-12
Payer: MEDICARE

## 2023-11-09 PROCEDURE — 97110 THERAPEUTIC EXERCISES: CPT

## 2023-11-09 PROCEDURE — 97140 MANUAL THERAPY 1/> REGIONS: CPT

## 2023-11-09 NOTE — PROGRESS NOTES
Andres Israel  : 1970  Primary: [facility] Medicare Advantage (Medicare Managed)  Secondary:  [address] @ [address] [address]  Phone: [phone]  Fax: [phone]    Plan of Care/Certification Expiration Date: 12/10/23      >PT Visit Info:  Plan of Care/Certification Expiration Date: 12/10/23      Visit Count:  11    OUTPATIENT PHYSICAL THERAPY:OP NOTE TYPE: Treatment Note and Progress Report 2023       Episode  }Appt Desk             Treatment Diagnosis:  Pain in Left Knee (M25.562)  Difficulty in walking, Not elsewhere classified (R26.2)  Pain in thoracic spine (M54.6)  Medical/Referring Diagnosis:  Chronic bilateral thoracic back pain [M54.6, G89.29]  Chronic pain of left knee [S22.303, G89.29]  Referring Physician:  Abebe Leyva DO MD Orders:  PT Eval and Treat   Date of Onset:  No data recorded   Allergies:   Latex, Aspirin, Amoxicillin, Erythromycin, Mometasone, Montelukast, and Petrolatum  Restrictions/Precautions:  No data recordedNo data recorded     Interventions Planned (Treatment may consist of any combination of the following):    Current Treatment Recommendations: Strengthening; ROM; Endurance training; Balance training; Gait training; Stair training; Neuromuscular re-education; Manual; Pain management; Home exercise program; Dry needling; Positioning; Therapeutic activities     >Subjective Comments \"Im feeling better. I think I just need to work on some strengthening at this point. \" Pt reports she is doing much better and she is having less pain and stiffness. >Initial:      -3/10>Post Session:        -3/10   Medications Last Reviewed:  2023  Updated Objective Findings:      23:    B UE strength /5. B LE strength . B shoulder ROM WNL  Cervical ROM WNL: avoided R lateral flexion due to history of vertigo for 18 years ago  Thoracic spine ROM WNL.     Pt would benefit from continued PT for 1-2 times a week for

## 2023-11-17 ENCOUNTER — HOSPITAL ENCOUNTER (OUTPATIENT)
Dept: PHYSICAL THERAPY | Age: 53
Setting detail: RECURRING SERIES
Discharge: HOME | End: 2023-11-20
Payer: MEDICARE

## 2023-11-17 PROCEDURE — 97140 MANUAL THERAPY 1/> REGIONS: CPT

## 2023-11-17 NOTE — PROGRESS NOTES
core and stretching for the lumbar and thoracic region to decrease pain. Treatment   THERAPEUTIC ACTIVITY: (see below for minutes): Therapeutic activities below to improve mobility, strength, balance and coordination. Required minimal visual, verbal, manual and tactile cues to improve independence and safety with daily activities. THERAPEUTIC EXERCISE: (see below for minutes): Exercises below to improve mobility, strength, balance and coordination. Required minimal verbal and manual cues to promote proper body alignment, promote proper body posture and promote proper body mechanics. Progressed resistance, range, repetitions and complexity of movement as indicated. NEUROMUSCULAR RE-ED: (see below for minutes): Neuromuscular re-education to restore normal body movement patterns. MANUAL THERAPY: (see below for minutes): Joint mobilization and Soft tissue mobilization was utilized and necessary because of the patient's restricted joint motion, painful spasm, loss of articular motion, and restricted motion of soft tissue. MODALITIES: (see below for minutes): to decrease pain and/or swelling  GAIT TRAINING: (see below for minutes): Gait training to improve and/or restore physical functioning as related to mobility, balance and coordination  SELF CARE: (see below for minutes): Self care to improve and/or restore self-care/home management as related to dressing, bathing and grooming. Required minimal verbal cueing to facilitate activities of daily living skills and compensatory activities  MECHANICAL TRACTION: (see below for minutes): Traction was used due to the patient's radiculopathy in order to relieve pain in or originating from the spine. Manual: (45 mins )      Dry needling: pL parapsinal from T4 down to L1 and into the piriformis bilaterally with several needles static while pistoning with others. Seated needling for the L cervical spine and into UT for the tightness.        STM: and mobilization of the

## 2023-11-27 ENCOUNTER — HOSPITAL ENCOUNTER (OUTPATIENT)
Dept: PHYSICAL THERAPY | Age: 53
Setting detail: RECURRING SERIES
Discharge: HOME | End: 2023-11-30
Payer: MEDICARE

## 2023-11-27 PROCEDURE — 97110 THERAPEUTIC EXERCISES: CPT

## 2023-11-27 PROCEDURE — 97140 MANUAL THERAPY 1/> REGIONS: CPT

## 2023-11-27 NOTE — PROGRESS NOTES
Graciela Vela  : 1970  Primary: [facility] Medicare Advantage (Medicare Managed)  Secondary:  [address] @ [address] [address]  Phone: [phone]  Fax: [phone]    Plan of Care/Certification Expiration Date: 12/10/23      >PT Visit Info:  Plan of Care/Certification Expiration Date: 12/10/23      Visit Count:  13    OUTPATIENT PHYSICAL THERAPY:OP NOTE TYPE: Treatment Note 2023       Episode  }Appt Desk             Treatment Diagnosis:  Pain in Left Knee (M25.562)  Difficulty in walking, Not elsewhere classified (R26.2)  Pain in thoracic spine (M54.6)  Medical/Referring Diagnosis:  Chronic bilateral thoracic back pain [M54.6, G89.29]  Chronic pain of left knee [Z63.731, G89.29]  Referring Physician:  Marylen Opitz, DO MD Orders:  PT Eval and Treat   Date of Onset:  No data recorded   Allergies:   Latex, Aspirin, Amoxicillin, Erythromycin, Mometasone, Montelukast, and Petrolatum  Restrictions/Precautions:  No data recordedNo data recorded     Interventions Planned (Treatment may consist of any combination of the following):    Current Treatment Recommendations: Strengthening; ROM; Endurance training; Balance training; Gait training; Stair training; Neuromuscular re-education; Manual; Pain management; Home exercise program; Dry needling; Positioning; Therapeutic activities     >Subjective Comments \"I was really sore after the last time for like 5 days but overall I'm still feeling better and I started walking more which is nice. \"      >Initial:      -3/10>Post Session:        -3/10   Medications Last Reviewed:  2023  Updated Objective Findings:      23:    B UE strength /5. B LE strength /5. B shoulder ROM WNL  Cervical ROM WNL: avoided R lateral flexion due to history of vertigo for 18 years ago  Thoracic spine ROM WNL.     Pt would benefit from continued PT for 1-2 times a week for another 3-4 weeks to work on strengthening for the

## 2023-12-08 ENCOUNTER — APPOINTMENT (OUTPATIENT)
Dept: PHYSICAL THERAPY | Age: 53
End: 2023-12-08
Payer: MEDICARE

## 2023-12-13 ENCOUNTER — APPOINTMENT (OUTPATIENT)
Dept: PHYSICAL THERAPY | Age: 53
End: 2023-12-13
Payer: MEDICARE

## 2023-12-20 ENCOUNTER — APPOINTMENT (OUTPATIENT)
Dept: PHYSICAL THERAPY | Age: 53
End: 2023-12-20
Payer: MEDICARE

## 2023-12-27 ENCOUNTER — APPOINTMENT (OUTPATIENT)
Dept: PHYSICAL THERAPY | Age: 53
End: 2023-12-27
Payer: MEDICARE

## 2024-01-12 DIAGNOSIS — E03.9 PRIMARY HYPOTHYROIDISM: ICD-10-CM

## 2024-01-22 ENCOUNTER — HOSPITAL ENCOUNTER (OUTPATIENT)
Dept: PHYSICAL THERAPY | Age: 54
Setting detail: RECURRING SERIES
Discharge: HOME | End: 2024-01-25
Payer: MEDICARE

## 2024-01-22 DIAGNOSIS — M50.30 DDD (DEGENERATIVE DISC DISEASE), CERVICAL: ICD-10-CM

## 2024-01-22 DIAGNOSIS — R26.2 DIFFICULTY IN WALKING, NOT ELSEWHERE CLASSIFIED: ICD-10-CM

## 2024-01-22 DIAGNOSIS — M54.6 PAIN IN THORACIC SPINE: ICD-10-CM

## 2024-01-22 DIAGNOSIS — M25.562 LEFT KNEE PAIN, UNSPECIFIED CHRONICITY: Primary | ICD-10-CM

## 2024-01-22 PROCEDURE — 97162 PT EVAL MOD COMPLEX 30 MIN: CPT

## 2024-01-22 PROCEDURE — 97110 THERAPEUTIC EXERCISES: CPT

## 2024-01-22 NOTE — THERAPY EVALUATION
Madeline Lynne  : 1970  Primary: [employer] (Medicare Managed)  Secondary:  [facility] @ [place] [address]  Phone: [phone]  Fax: [phone] Plan Frequency: 1-2 times a week    Plan of Care/Certification Expiration Date: 24        Plan of Care/Certification Expiration Date:  Plan of Care/Certification Expiration Date: 24    Frequency/Duration: Plan Frequency: 1-2 times a week      Time In/Out:   Time In: 0200  Time Out: 0245      PT Visit Info:    Plan Frequency: 1-2 times a week      Visit Count:  14                OUTPATIENT PHYSICAL THERAPY:             Initial Assessment 2024               Episode (PT thoracic pain)         Treatment Diagnosis:    Left knee pain, unspecified chronicity  Difficulty in walking, not elsewhere classified  Pain in thoracic spine  DDD (degenerative disc disease), cervical  Medical/Referring Diagnosis:    DDD (degenerative disc disease), cervical [M50.30]  Chondromalacia of left knee [M94.262]  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 Mostly with reaching away from the body in the kitchen, washing dishes, etc. She stated she was able to do most of her things in the travel trailer on vacation due to being close quarters and she knew how to manage over the time she was gone but since she was home she started having some of the same issues due to the space from the counter to the sink especially. She is having

## 2024-01-24 NOTE — PROGRESS NOTES
Madeline Lynne  : 1970  Primary: [employer] (Medicare Managed)  Secondary:  [facility] @ [place] [address]  Phone: [phone]  Fax: [phone] Plan Frequency: 1-2 times a week    Plan of Care/Certification Expiration Date: 24        Plan of Care/Certification Expiration Date:  Plan of Care/Certification Expiration Date: 24    Frequency/Duration:   Plan Frequency: 1-2 times a week      Time In/Out:   Time In: 0200  Time Out: 0245      PT Visit Info:    Plan Frequency: 1-2 times a week      Visit Count:  14    OUTPATIENT PHYSICAL THERAPY:   Treatment Note 2024       Episode  (PT thoracic pain)               Treatment Diagnosis:    Left knee pain, unspecified chronicity  Difficulty in walking, not elsewhere classified  Pain in thoracic spine  DDD (degenerative disc disease), cervical  Medical/Referring Diagnosis:    DDD (degenerative disc disease), cervical [M50.30]  Chondromalacia of left knee [M94.262]  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]      Referring Physician:  Rowena Garcia MD MD Orders:  PT Eval and Treat   Return MD Appt:  tbd   Date of Onset:  Onset Date: 22     Allergies:   Latex, Aspirin, Amoxicillin, Erythromycin, Mometasone, Montelukast, and Petrolatum  Restrictions/Precautions:   None      Interventions Planned (Treatment may consist of any combination of the following):  Current Treatment Recommendations: Strengthening; ROM; Endurance training; Balance training; Gait training; Stair training; Neuromuscular re-education; Manual; Pain management; Home exercise program; Dry needling; Positioning; Therapeutic activities    See Assessment Note    Subjective Comments:   See eval  Initial Pain Level::      /10  Post Session Pain Level:        /10  Medications Last Reviewed:  2024  Updated Objective Findings:  See Evaluation Note from today  Treatment   TREATMENT:   THERAPEUTIC ACTIVITY: ( see

## 2024-01-30 ENCOUNTER — HOSPITAL ENCOUNTER (OUTPATIENT)
Dept: PHYSICAL THERAPY | Age: 54
Setting detail: RECURRING SERIES
Discharge: HOME | End: 2024-02-02
Payer: MEDICARE

## 2024-01-30 PROCEDURE — 97110 THERAPEUTIC EXERCISES: CPT

## 2024-01-30 PROCEDURE — 97140 MANUAL THERAPY 1/> REGIONS: CPT

## 2024-01-30 NOTE — PROGRESS NOTES
Last Reviewed:  1/30/2024  Updated Objective Findings:  None Today  Treatment   TREATMENT:   THERAPEUTIC ACTIVITY: ( see below for minutes): Therapeutic activities per grid below to improve mobility, strength, balance and coordination. Required minimal visual, verbal, manual and tactile cues to improve independence and safety with daily activities .  THERAPEUTIC EXERCISE: (see below for minutes): Exercises per grid below to improve mobility, strength, balance and coordination. Required minimal verbal and manual cues to promote proper body alignment, promote proper body posture and promote proper body mechanics. Progressed resistance, range, repetitions and complexity of movement as indicated.  MANUAL THERAPY: (see below for minutes): Joint mobilization and Soft tissue mobilization was utilized and necessary because of the patient's restricted joint motion, painful spasm, loss of articular motion and restricted motion of soft tissue.   MODALITIES: (see below for minutes): to decrease pain   SELF CARE: (see below for minutes): Procedure(s) (per grid) utilized to improve and/or restore self-care/home management as related to dressing, bathing and grooming. Required minimal verbal cueing to facilitate activities of daily living skills and compensatory activities    Date: 1-22-24  (EVAL)  1-30-24  (Visit 2)       Modalities:                                Therapeutic Exercise:  20 mins       TFL stretch reverse on table   2x10SH (unable to feel a stretch)       SL clamshells   X20       Bridging   X20       Pelvic tilts   X20                       Proprioceptive Activities:                                Manual Therapy:  25 mins       Dry needling   R TFL into the lower back and across the \"shelf\" into the L piriformis region followed by STM               Functional Activities:                                            Treatment/Session Summary:    Treatment Assessment:   Pt continues to have some of the stiffness in

## 2024-02-06 ENCOUNTER — HOSPITAL ENCOUNTER (OUTPATIENT)
Dept: PHYSICAL THERAPY | Age: 54
Setting detail: RECURRING SERIES
Discharge: HOME | End: 2024-02-09
Payer: MEDICARE

## 2024-02-06 PROCEDURE — 97140 MANUAL THERAPY 1/> REGIONS: CPT

## 2024-02-06 PROCEDURE — 97110 THERAPEUTIC EXERCISES: CPT

## 2024-02-06 NOTE — PROGRESS NOTES
the SIJ and the sacral borders with focus on the R side today              Functional Activities:                                            Treatment/Session Summary:    Treatment Assessment:   Discontinued the SLR with the ER due to the knee bothering her with this exercise and we modified with standing SLR without knee flexion as well as standing hip abduction. Pt was told to stay away from anything with rotation in the knee at the time and to work on stretching for the hamstring and keeping the knee in extension with exercises to not stress it. Pt is doing well the thoracic spine but stated she is still having some trouble with the hip.   Communication/Consultation:  None today  Equipment provided today:  HEP  Recommendations/Intent for next treatment session: Next visit will focus on stretching for the mid back, needling and massage for the back and the piriformis, strengthening and stability for the knee.    >Total Treatment Billable Duration:  45  minutes   Time In: 0330  Time Out: 0415    Teena Jennings PT         Charge Capture  [facility] Portal  Appt Desk     Future Appointments   Date Time Provider Department Center   2/6/2024  3:30 PM Teena Jennings PT [facility]   2/13/2024  3:30 PM Teena Jennings PT [facility]   2/20/2024  3:30 PM Teena Jennings PT [facility]   2/27/2024  3:30 PM Teena Jennings PT [facility]

## 2024-02-13 ENCOUNTER — HOSPITAL ENCOUNTER (OUTPATIENT)
Dept: PHYSICAL THERAPY | Age: 54
Setting detail: RECURRING SERIES
Discharge: HOME | End: 2024-02-16
Payer: MEDICARE

## 2024-02-13 PROCEDURE — 97110 THERAPEUTIC EXERCISES: CPT

## 2024-02-13 PROCEDURE — 97140 MANUAL THERAPY 1/> REGIONS: CPT

## 2024-02-13 NOTE — PROGRESS NOTES
Madeline Lynne  : 1970  Primary: [employer] (Medicare Managed)  Secondary:  [facility] @ [place]  [place] [address]  Phone: [phone]  Fax: [phone] Plan Frequency: 1-2 times a week    Plan of Care/Certification Expiration Date: 24        Plan of Care/Certification Expiration Date:  Plan of Care/Certification Expiration Date: 24    Frequency/Duration:   Plan Frequency: 1-2 times a week      Time In/Out:   Time In: 330  Time Out: 415      PT Visit Info:    Plan Frequency: 1-2 times a week      Visit Count:  17    OUTPATIENT PHYSICAL THERAPY:   Treatment Note 2024                                                  Episode  (PT thoracic pain)               Treatment Diagnosis:    Left knee pain, unspecified chronicity  Difficulty in walking, not elsewhere classified  Pain in thoracic spine  DDD (degenerative disc disease), cervical  Medical/Referring Diagnosis:    DDD (degenerative disc disease), cervical [M50.30]  Chondromalacia of left knee [M94.262]  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]      Referring Physician:  Rowena Garcia MD MD Orders:  PT Eval and Treat   Return MD Appt:  tbd   Date of Onset:  Onset Date: 22     Allergies:   Latex, Aspirin, Amoxicillin, Erythromycin, Mometasone, Montelukast, and Petrolatum  Restrictions/Precautions:   None      Interventions Planned (Treatment may consist of any combination of the following):  Current Treatment Recommendations: Strengthening; ROM; Endurance training; Balance training; Gait training; Stair training; Neuromuscular re-education; Manual; Pain management; Home exercise program; Dry needling; Positioning; Therapeutic activities    See Assessment Note    Subjective Comments:   L knee is  and swollen at times with exercises especially the SLR   Thoracic spine is tight   R hip is tender and sore but felt better after last time for a while   Initial

## 2024-02-20 ENCOUNTER — HOSPITAL ENCOUNTER (OUTPATIENT)
Dept: PHYSICAL THERAPY | Age: 54
Setting detail: RECURRING SERIES
Discharge: HOME | End: 2024-02-23
Payer: MEDICARE

## 2024-02-20 PROCEDURE — 97140 MANUAL THERAPY 1/> REGIONS: CPT

## 2024-02-20 PROCEDURE — 97110 THERAPEUTIC EXERCISES: CPT

## 2024-02-20 NOTE — PROGRESS NOTES
Pain Level::     3 /10  Post Session Pain Level:       3 /10  Medications Last Reviewed:  2/20/2024  Updated Objective Findings:  None Today  Treatment   TREATMENT:   THERAPEUTIC ACTIVITY: ( see below for minutes): Therapeutic activities per grid below to improve mobility, strength, balance and coordination. Required minimal visual, verbal, manual and tactile cues to improve independence and safety with daily activities .  THERAPEUTIC EXERCISE: (see below for minutes): Exercises per grid below to improve mobility, strength, balance and coordination. Required minimal verbal and manual cues to promote proper body alignment, promote proper body posture and promote proper body mechanics. Progressed resistance, range, repetitions and complexity of movement as indicated.  MANUAL THERAPY: (see below for minutes): Joint mobilization and Soft tissue mobilization was utilized and necessary because of the patient's restricted joint motion, painful spasm, loss of articular motion and restricted motion of soft tissue.   MODALITIES: (see below for minutes): to decrease pain   SELF CARE: (see below for minutes): Procedure(s) (per grid) utilized to improve and/or restore self-care/home management as related to dressing, bathing and grooming. Required minimal verbal cueing to facilitate activities of daily living skills and compensatory activities    Date: 1-22-24  (EVAL)  1-30-24  (Visit 2)  2-6-24  (Visit 3)  2-13-24  (Visit 4 )  2-20-24  (Visit 5)    Modalities:                                Therapeutic Exercise:  20 mins  15 mins  15 mins  15 mins    TFL stretch reverse on table   2x10SH (unable to feel a stretch)       SL clamshells   X20       Bridging   X20  Repeat     Pelvic tilts   X20  X10      SL thoracic rotation    X5 bilateral   Repeat    Cat/camel    X5          Standing thoracic rotation x 5 bilateral  Repeat        Rows (lower trap)  Black band x10  W's hold with walk red band 2L        Scap retraction/protraction

## 2024-02-27 ENCOUNTER — HOSPITAL ENCOUNTER (OUTPATIENT)
Dept: PHYSICAL THERAPY | Age: 54
Setting detail: RECURRING SERIES
Discharge: HOME | End: 2024-03-01
Payer: MEDICARE

## 2024-02-27 PROCEDURE — G0283 ELEC STIM OTHER THAN WOUND: HCPCS

## 2024-02-27 PROCEDURE — 97110 THERAPEUTIC EXERCISES: CPT

## 2024-02-27 PROCEDURE — 97140 MANUAL THERAPY 1/> REGIONS: CPT

## 2024-02-27 NOTE — PROGRESS NOTES
Madeline Lynne  : 1970  Primary: [employer] (Medicare Managed)  Secondary:  [facility] @ [place] [address]  Phone: [phone]  Fax: [phone] Plan Frequency: 1-2 times a week    Plan of Care/Certification Expiration Date: 24        Plan of Care/Certification Expiration Date:  Plan of Care/Certification Expiration Date: 24    Frequency/Duration:   Plan Frequency: 1-2 times a week      Time In/Out:   Time In: 0330  Time Out: 0430      PT Visit Info:    Plan Frequency: 1-2 times a week      Visit Count:  19    OUTPATIENT PHYSICAL THERAPY:   Treatment Note 2024                                                  Episode  (PT thoracic pain)               Treatment Diagnosis:    Left knee pain, unspecified chronicity  Difficulty in walking, not elsewhere classified  Pain in thoracic spine  DDD (degenerative disc disease), cervical  Medical/Referring Diagnosis:    DDD (degenerative disc disease), cervical [M50.30]  Chondromalacia of left knee [M94.262]  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]      Referring Physician:  Rowena Garcia MD MD Orders:  PT Eval and Treat   Return MD Appt:  tbd   Date of Onset:  Onset Date: 22     Allergies:   Latex, Aspirin, Amoxicillin, Erythromycin, Mometasone, Montelukast, and Petrolatum  Restrictions/Precautions:   None      Interventions Planned (Treatment may consist of any combination of the following):  Current Treatment Recommendations: Strengthening; ROM; Endurance training; Balance training; Gait training; Stair training; Neuromuscular re-education; Manual; Pain management; Home exercise program; Dry needling; Positioning; Therapeutic activities    See Assessment Note    Subjective Comments:   \"Im feeling better in the hip but just a little spot of pain in the front and the back of that hipbone on the R side.\"   \"My neck feels better and the thoracic feels better just one spot

## 2024-03-05 ENCOUNTER — HOSPITAL ENCOUNTER (OUTPATIENT)
Dept: PHYSICAL THERAPY | Age: 54
Setting detail: RECURRING SERIES
Discharge: HOME | End: 2024-03-08
Payer: MEDICARE

## 2024-03-05 PROCEDURE — 97140 MANUAL THERAPY 1/> REGIONS: CPT

## 2024-03-05 PROCEDURE — 97110 THERAPEUTIC EXERCISES: CPT

## 2024-03-05 NOTE — PROGRESS NOTES
Madeline Maged  : 1970  Primary: [employer] (Medicare Managed)  Secondary:  [facility] [place]  [place] [address]  Phone: [phone]  Fax: [phone] Plan Frequency: 1-2 times a week    Plan of Care/Certification Expiration Date: 24        Plan of Care/Certification Expiration Date:  Plan of Care/Certification Expiration Date: 24    Frequency/Duration:   Plan Frequency: 1-2 times a week      Time In/Out:   Time In: 0330  Time Out: 425      PT Visit Info:    Plan Frequency: 1-2 times a week      Visit Count:  20    OUTPATIENT PHYSICAL THERAPY:   Treatment Note 3/5/2024                                                  Episode  (PT thoracic pain)               Treatment Diagnosis:    Left knee pain, unspecified chronicity  Difficulty in walking, not elsewhere classified  Pain in thoracic spine  DDD (degenerative disc disease), cervical  Medical/Referring Diagnosis:    DDD (degenerative disc disease), cervical [M50.30]  Chondromalacia of left knee [M94.262]  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]      Referring Physician:  Rowena Garcia MD MD Orders:  PT Eval and Treat   Return MD Appt:  tbd   Date of Onset:  Onset Date: 22     Allergies:   Latex, Aspirin, Amoxicillin, Erythromycin, Mometasone, Montelukast, and Petrolatum  Restrictions/Precautions:   None      Interventions Planned (Treatment may consist of any combination of the following):  Current Treatment Recommendations: Strengthening; ROM; Endurance training; Balance training; Gait training; Stair training; Neuromuscular re-education; Manual; Pain management; Home exercise program; Dry needling; Positioning; Therapeutic activities    See Assessment Note    Subjective Comments:     Initial Pain Level::     1 /10  Post Session Pain Level:       1 /10  Medications Last Reviewed:  3/5/2024  Updated Objective Findings:  None Today  Treatment   TREATMENT:   THERAPEUTIC

## 2024-03-12 ENCOUNTER — HOSPITAL ENCOUNTER (OUTPATIENT)
Dept: PHYSICAL THERAPY | Age: 54
Setting detail: RECURRING SERIES
Discharge: HOME | End: 2024-03-15
Payer: MEDICARE

## 2024-03-12 PROCEDURE — 97110 THERAPEUTIC EXERCISES: CPT

## 2024-03-12 PROCEDURE — 97140 MANUAL THERAPY 1/> REGIONS: CPT

## 2024-03-12 NOTE — PROGRESS NOTES
thoracic rotation x 5 bilateral  Repeat  Hip hikes x 10 bilateral (pt had some knee pain on the R side)          Rows (lower trap)  Black band x10  W's hold with walk red band 2L  Abdominal bracing with supine marching with stabilizer training   Abdominal bracing with pelvic tilts        Scap retraction/protraction against wall x 10  Repeat   Scap retraction in W with the green band and focusing on the lower trap     Manual Therapy:  25 mins  30 mins  30 mins  30 mins  15 mins  30 mins  30 mins    Dry needling   R TFL into the lower back and across the \"shelf\" into the L piriformis region followed by STM  Repeat needling across the SIJ and the sacral borders with focus on the R side today  Needling in the L UT and L thoracic paraspinals working into subscap and suprascap with radicular symptoms reproduced followed by STM in prone  Needling in the transverse abdominus on R side   R TFL and posterior hip followed by STM and pelvic mobilization  Needling in the R mid back paraspinal  Needling in the transverse abdominus on R side anterior and posterior to the hip joint followed by STM and mobilization and needing in the  Rthoracic region around T4 only  Needling in the L parapsinals and transverse abdominals on the R side as well as in the posterior hip joint on the L side with stretching to follow and STM in sitting for the UT and the scap muscles Needling in the R anterior transverse abdominal region followed by L thoracic paraspinal and               Functional Activities:                                                           Treatment/Session Summary:    Treatment Assessment:   Pt continues to work on strengthening and stretching and is continuing to show progress.   Communication/Consultation:  None today  Equipment provided today:  HEP  Recommendations/Intent for next treatment session: Next visit will focus on stretching for the mid back, needling and massage for the back and the piriformis, strengthening

## 2024-03-20 ENCOUNTER — APPOINTMENT (OUTPATIENT)
Dept: PHYSICAL THERAPY | Age: 54
End: 2024-03-20
Payer: MEDICARE

## 2024-03-22 DIAGNOSIS — E03.9 PRIMARY HYPOTHYROIDISM: ICD-10-CM

## 2024-03-22 RX ORDER — LEVOTHYROXINE SODIUM 50 MCG
100 TABLET ORAL DAILY
Qty: 180 TABLET | Refills: 0 | Status: SHIPPED | OUTPATIENT
Start: 2024-03-22 | End: 2024-05-10 | Stop reason: SDUPTHER

## 2024-03-22 NOTE — TELEPHONE ENCOUNTER
Rx sent for 90 day quantity. Was instructed at last visit to be seen in January and has not been seen since. Does not have an appointment scheduled. Please ask her to schedule.     Mary Lou Shaffer, APRN - CNP

## 2024-03-26 ENCOUNTER — HOSPITAL ENCOUNTER (OUTPATIENT)
Dept: PHYSICAL THERAPY | Age: 54
Setting detail: RECURRING SERIES
Discharge: HOME | End: 2024-03-29
Payer: MEDICARE

## 2024-03-26 PROCEDURE — 97140 MANUAL THERAPY 1/> REGIONS: CPT

## 2024-03-26 PROCEDURE — 97110 THERAPEUTIC EXERCISES: CPT

## 2024-03-26 NOTE — PROGRESS NOTES
Madeline Lynne  : 1970  Primary: [employer] (Medicare Managed)  Secondary:  [facility] [place]  [place] [address]  Phone: [phone]  Fax: [phone] Plan Frequency: 1-2 times a week    Plan of Care/Certification Expiration Date: 24        Plan of Care/Certification Expiration Date:  Plan of Care/Certification Expiration Date: 24    Frequency/Duration:   Plan Frequency: 1-2 times a week      Time In/Out:   Time In: 0330  Time Out: 041      PT Visit Info:    Plan Frequency: 1-2 times a week      Visit Count:  22    OUTPATIENT PHYSICAL THERAPY:   Treatment Note and Progress Note  3/26/2024                                                  Episode  (PT thoracic pain)               Treatment Diagnosis:    Left knee pain, unspecified chronicity  Difficulty in walking, not elsewhere classified  Pain in thoracic spine  DDD (degenerative disc disease), cervical  Medical/Referring Diagnosis:    DDD (degenerative disc disease), cervical [M50.30]  Chondromalacia of left knee [M94.262]  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]      Referring Physician:  Rowena Garcia MD MD Orders:  PT Eval and Treat   Return MD Appt:  tbd   Date of Onset:  Onset Date: 22     Allergies:   Latex, Aspirin, Amoxicillin, Erythromycin, Mometasone, Montelukast, and Petrolatum  Restrictions/Precautions:   None      Interventions Planned (Treatment may consist of any combination of the following):  Current Treatment Recommendations: Strengthening; ROM; Endurance training; Balance training; Gait training; Stair training; Neuromuscular re-education; Manual; Pain management; Home exercise program; Dry needling; Positioning; Therapeutic activities    See Assessment Note    Subjective Comments: \"Im still having some of the back pain in the shoulder blade and the hip is still hurting but the neck is feeling better and the knee isnt bothering me.\"     Initial Pain

## 2024-04-16 ENCOUNTER — HOSPITAL ENCOUNTER (OUTPATIENT)
Dept: PHYSICAL THERAPY | Age: 54
Setting detail: RECURRING SERIES
Discharge: HOME | End: 2024-04-19
Payer: MEDICARE

## 2024-04-16 PROCEDURE — 97140 MANUAL THERAPY 1/> REGIONS: CPT

## 2024-04-16 PROCEDURE — 97110 THERAPEUTIC EXERCISES: CPT

## 2024-04-16 NOTE — PROGRESS NOTES
Madeline Lynne  : 1970  Primary: [employer] (Medicare Managed)  Secondary:  [facility] [place] [address]  Phone: [phone]  Fax: [phone] Plan Frequency: 1-2 times a week    Plan of Care/Certification Expiration Date: 24        Plan of Care/Certification Expiration Date:  Plan of Care/Certification Expiration Date: 24    Frequency/Duration:   Plan Frequency: 1-2 times a week      Time In/Out:   Time In: 0330  Time Out: 0430      PT Visit Info:    Plan Frequency: 1-2 times a week      Visit Count:  23    OUTPATIENT PHYSICAL THERAPY:   Treatment Note   2024                                                  Episode  (PT thoracic pain)               Treatment Diagnosis:    Left knee pain, unspecified chronicity  Difficulty in walking, not elsewhere classified  Pain in thoracic spine  DDD (degenerative disc disease), cervical  Medical/Referring Diagnosis:    DDD (degenerative disc disease), cervical [M50.30]  Chondromalacia of left knee [M94.262]  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]      Referring Physician:  Rowena Garcia MD MD Orders:  PT Eval and Treat   Return MD Appt:  tbd   Date of Onset:  Onset Date: 22     Allergies:   Latex, Aspirin, Amoxicillin, Erythromycin, Mometasone, Montelukast, and Petrolatum  Restrictions/Precautions:   None      Interventions Planned (Treatment may consist of any combination of the following):  Current Treatment Recommendations: Strengthening; ROM; Endurance training; Balance training; Gait training; Stair training; Neuromuscular re-education; Manual; Pain management; Home exercise program; Dry needling; Positioning; Therapeutic activities    See Assessment Note    Subjective Comments: \"Im still having some of the back pain in the shoulder blade and the hip is still hurting but the neck is feeling better and the knee isnt bothering me.\"     Initial Pain Level::     1

## 2024-04-18 ENCOUNTER — APPOINTMENT (OUTPATIENT)
Dept: PHYSICAL THERAPY | Age: 54
End: 2024-04-18
Payer: MEDICARE

## 2024-04-23 ENCOUNTER — HOSPITAL ENCOUNTER (OUTPATIENT)
Dept: PHYSICAL THERAPY | Age: 54
Setting detail: RECURRING SERIES
Discharge: HOME | End: 2024-04-26
Payer: MEDICARE

## 2024-04-23 PROCEDURE — 97110 THERAPEUTIC EXERCISES: CPT

## 2024-04-23 PROCEDURE — 97140 MANUAL THERAPY 1/> REGIONS: CPT

## 2024-04-23 NOTE — PROGRESS NOTES
Madeline Lynne  : 1970  Primary: [employer] (Medicare Managed)  Secondary:  [facility] [place]  [place] [address]  Phone: [phone]  Fax: [phone] Plan Frequency: 1-2 times a week    Plan of Care/Certification Expiration Date: 24        Plan of Care/Certification Expiration Date:  Plan of Care/Certification Expiration Date: 24    Frequency/Duration:   Plan Frequency: 1-2 times a week      Time In/Out:   Time In: 0  Time Out: 042      PT Visit Info:    Plan Frequency: 1-2 times a week      Visit Count:  24    OUTPATIENT PHYSICAL THERAPY:   Treatment Note and Recertification   2024                                                  Episode  (PT thoracic pain)               Treatment Diagnosis:    Left knee pain, unspecified chronicity  Difficulty in walking, not elsewhere classified  Pain in thoracic spine  DDD (degenerative disc disease), cervical  Medical/Referring Diagnosis:    DDD (degenerative disc disease), cervical [M50.30]  Chondromalacia of left knee [M94.262]  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]      Referring Physician:  Rowena Garcia MD MD Orders:  PT Eval and Treat   Return MD Appt:  tbd   Date of Onset:  Onset Date: 22     Allergies:   Latex, Aspirin, Amoxicillin, Erythromycin, Mometasone, Montelukast, and Petrolatum  Restrictions/Precautions:   None      Interventions Planned (Treatment may consist of any combination of the following):  Current Treatment Recommendations: Strengthening; ROM; Endurance training; Balance training; Gait training; Stair training; Neuromuscular re-education; Manual; Pain management; Home exercise program; Dry needling; Positioning; Therapeutic activities    See Assessment Note    Subjective Comments: \"The knee only has one little spot on the medial side. I think the mid back in the ribs might be from using the band too heavy and bracing to do the exercises and the

## 2024-04-25 ENCOUNTER — APPOINTMENT (OUTPATIENT)
Dept: PHYSICAL THERAPY | Age: 54
End: 2024-04-25
Payer: MEDICARE

## 2024-04-30 ENCOUNTER — HOSPITAL ENCOUNTER (OUTPATIENT)
Dept: PHYSICAL THERAPY | Age: 54
Setting detail: RECURRING SERIES
Discharge: HOME | End: 2024-05-03
Payer: MEDICARE

## 2024-04-30 PROCEDURE — 97110 THERAPEUTIC EXERCISES: CPT

## 2024-04-30 PROCEDURE — 97140 MANUAL THERAPY 1/> REGIONS: CPT

## 2024-04-30 NOTE — PROGRESS NOTES
Madeline Lynne  : 1970  Primary: [employer] (Medicare Managed)  Secondary:  [facility] [place]  [place] [address]  Phone: [phone]  Fax: [phone] Plan Frequency: 1-2 times a week    Plan of Care/Certification Expiration Date: 24        Plan of Care/Certification Expiration Date:  Plan of Care/Certification Expiration Date: 24    Frequency/Duration:   Plan Frequency: 1-2 times a week      Time In/Out:   Time In: 330  Time Out: 415      PT Visit Info:    Plan Frequency: 1-2 times a week      Visit Count:  25    OUTPATIENT PHYSICAL THERAPY:   Treatment Note  2024                                                  Episode  (PT thoracic pain)               Treatment Diagnosis:    Left knee pain, unspecified chronicity  Difficulty in walking, not elsewhere classified  Pain in thoracic spine  DDD (degenerative disc disease), cervical  Medical/Referring Diagnosis:    DDD (degenerative disc disease), cervical [M50.30]  Chondromalacia of left knee [M94.262]  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]      Referring Physician:  Rowena Garcia MD MD Orders:  PT Eval and Treat   Return MD Appt:  tbd   Date of Onset:  Onset Date: 22     Allergies:   Latex, Aspirin, Amoxicillin, Erythromycin, Mometasone, Montelukast, and Petrolatum  Restrictions/Precautions:   None      Interventions Planned (Treatment may consist of any combination of the following):  Current Treatment Recommendations: Strengthening; ROM; Endurance training; Balance training; Gait training; Stair training; Neuromuscular re-education; Manual; Pain management; Home exercise program; Dry needling; Positioning; Therapeutic activities    See Assessment Note    Subjective Comments: \"Im having some pain in the back now from the tennis ball I think. It was on the R side this time.\"     Initial Pain Level::     1 /10  Post Session Pain Level:       1 /10  Medications

## 2024-05-02 ENCOUNTER — APPOINTMENT (OUTPATIENT)
Dept: PHYSICAL THERAPY | Age: 54
End: 2024-05-02
Payer: MEDICARE

## 2024-05-06 ENCOUNTER — HOSPITAL ENCOUNTER (OUTPATIENT)
Dept: PHYSICAL THERAPY | Age: 54
Setting detail: RECURRING SERIES
Discharge: HOME | End: 2024-05-09
Payer: MEDICARE

## 2024-05-06 PROCEDURE — 97140 MANUAL THERAPY 1/> REGIONS: CPT

## 2024-05-06 NOTE — PROGRESS NOTES
and thoracic below the ribs  Needling and STM in the flank and the lower thoracic paraspinals over the floating rib portion bilaterally as well as the piriformis and hip region                    Functional Activities:                                                                                    Treatment/Session Summary:    Treatment Assessment:   Pt was doing great and was using the tennis ball until the back bruised slightly and now she is having some trigger points and pain in the ribs. Pt is feeling better since this first started but will be continuing if able with therapy.   Communication/Consultation:  None today  Equipment provided today:  HEP  Recommendations/Intent for next treatment session: Next visit will focus on stretching for the mid back, needling and massage for the back and the piriformis, strengthening and stability for the knee.    >Total Treatment Billable Duration:  45  minutes   Time In: 0930  Time Out: 1015    Teena Jennings PT         [facility]ge [facility]ure  [facility]  Appt Desk     Future Appointments   Date Time Provider Department Center   5/9/2024  8:00 AM Teena Jennings PT [facility]   5/10/2024 10:45 AM Pradip Wagner MD END [facility]

## 2024-05-08 LAB
T4 FREE SERPL-MCNC: 1.61 NG/DL (ref 0.82–1.77)
TSH SERPL DL<=0.005 MIU/L-ACNC: 1.08 UIU/ML (ref 0.45–4.5)

## 2024-05-09 ENCOUNTER — HOSPITAL ENCOUNTER (OUTPATIENT)
Dept: PHYSICAL THERAPY | Age: 54
Setting detail: RECURRING SERIES
Discharge: HOME | End: 2024-05-12
Payer: MEDICARE

## 2024-05-09 PROCEDURE — 97140 MANUAL THERAPY 1/> REGIONS: CPT

## 2024-05-09 NOTE — PROGRESS NOTES
Madeline Lynne  : 1970  Primary: [employer] (Medicare Managed)  Secondary:  [facility] [place] [address]  Phone: [phone]  Fax: [phone] Plan Frequency: 1-2 times a week    Plan of Care/Certification Expiration Date: 24        Plan of Care/Certification Expiration Date:  Plan of Care/Certification Expiration Date: 24    Frequency/Duration:   Plan Frequency: 1-2 times a week      Time In/Out:   Time In: 0800  Time Out: 0845      PT Visit Info:    Plan Frequency: 1-2 times a week      Visit Count:  27    OUTPATIENT PHYSICAL THERAPY:   Treatment Note  2024                                                  Episode  (PT thoracic pain)               Treatment Diagnosis:    Left knee pain, unspecified chronicity  Difficulty in walking, not elsewhere classified  Pain in thoracic spine  DDD (degenerative disc disease), cervical  Medical/Referring Diagnosis:    DDD (degenerative disc disease), cervical [M50.30]  Chondromalacia of left knee [M94.262]  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]      Referring Physician:  Rowena Garcia MD MD Orders:  PT Eval and Treat   Return MD Appt:  tbd   Date of Onset:  Onset Date: 22     Allergies:   Latex, Aspirin, Amoxicillin, Erythromycin, Mometasone, Montelukast, and Petrolatum  Restrictions/Precautions:   None      Interventions Planned (Treatment may consist of any combination of the following):  Current Treatment Recommendations: Strengthening; ROM; Endurance training; Balance training; Gait training; Stair training; Neuromuscular re-education; Manual; Pain management; Home exercise program; Dry needling; Positioning; Therapeutic activities    See Assessment Note    Subjective Comments:  \"The L hip feels so much better. The R hip is still dicey.\"     Initial Pain Level::     1 /10  Post Session Pain Level:       1 /10  Medications Last Reviewed:  2024  Updated Objective

## 2024-05-10 ENCOUNTER — OFFICE VISIT (OUTPATIENT)
Dept: ENDOCRINOLOGY | Age: 54
End: 2024-05-10

## 2024-05-10 VITALS
HEART RATE: 71 BPM | WEIGHT: 114.8 LBS | HEIGHT: 66 IN | BODY MASS INDEX: 18.45 KG/M2 | SYSTOLIC BLOOD PRESSURE: 112 MMHG | OXYGEN SATURATION: 97 % | DIASTOLIC BLOOD PRESSURE: 64 MMHG

## 2024-05-10 DIAGNOSIS — E03.9 PRIMARY HYPOTHYROIDISM: Primary | ICD-10-CM

## 2024-05-10 DIAGNOSIS — D35.2 PITUITARY MICROADENOMA (HCC): ICD-10-CM

## 2024-05-10 DIAGNOSIS — E06.3 HASHIMOTO'S THYROIDITIS: ICD-10-CM

## 2024-05-10 RX ORDER — LEVOTHYROXINE SODIUM 50 MCG
100 TABLET ORAL DAILY
Qty: 180 TABLET | Refills: 3 | Status: SHIPPED | OUTPATIENT
Start: 2024-05-10

## 2024-05-10 ASSESSMENT — ENCOUNTER SYMPTOMS
VOICE CHANGE: 0
TROUBLE SWALLOWING: 0

## 2024-05-10 NOTE — PROGRESS NOTES
LETTY Wagner MD, [facility] ENDOCRINOLOGY   AND   THYROID NODULE CLINIC            Reason for visit: Follow-up of hypothyroidism        ASSESSMENT AND PLAN:    1. Primary hypothyroidism  She is now biochemically euthyroid, excluding her thyroid as a source of symptoms.  Continue Synthroid as prescribed.  Return in 12 months with labs.  I will provide her with longitudinal care.  - SYNTHROID 50 MCG tablet; Take 2 tablets by mouth Daily  Dispense: 180 tablet; Refill: 3  - TSH; Future  - T4, Free; Future    2. Hashimoto's thyroiditis    3. Pituitary microadenoma (HCC)  This requires no additional evaluation.      Follow-up and Dispositions    Return in about 1 year (around 5/10/2025).               History of Present Illness:    THYROID DYSFUNCTION  Madeline Lynne is seen for follow-up of primary hypothyroidism; this was diagnosed in approximately 1989.  She is known to have Hashimoto's thyroiditis.     Current symptoms:  See review of systems below    Menstrual/pregnancy history: She is amenorrheic due to prior endometrial ablation.  She has no prior pregnancies and no plans for children.     Prior treatment: She was started on Synthroid at diagnosis in ~1989.  This was discontinued shortly thereafter.  Synthroid was resumed in approximately 2003.  She had taken 125 mcg (50 mcg tablets x 2.5) daily since December 2015.  Her dose has been adjusted as follows:  -125 mcg daily six days per week and 100 mcg daily one day per week 3/12/2018   -112.5 mcg daily 6/12/2018  -100 mcg daily 3/14/2022     Pertinent labs:  1/17/2012: TSH 4.700, free T4 1.56.  7/18/2012: TSH 3.000, free T4 1.58.  7/19/2013: TSH 2.0, free T4 0.98.  12/12/2013: TSH 3.080, T4 10.9.  8/19/2014: TSH 5.170, T4 9.6, free thyroxine index 2.6.  10/21/2014: TSH 1.390, T4 11.6, free thyroxine index 3.2.  12/22/2014: TSH 0.792, free T4 1.55.  6/17/2015: TSH 2.13, T4 11.2, free thyroxine index 3.0.  12/10/2015: TSH 6.070, T4 11.5, free thyroxine

## 2024-05-14 ENCOUNTER — HOSPITAL ENCOUNTER (OUTPATIENT)
Dept: PHYSICAL THERAPY | Age: 54
Setting detail: RECURRING SERIES
Discharge: HOME | End: 2024-05-17
Payer: MEDICARE

## 2024-05-14 PROCEDURE — 97110 THERAPEUTIC EXERCISES: CPT

## 2024-05-14 PROCEDURE — 97140 MANUAL THERAPY 1/> REGIONS: CPT

## 2024-05-14 NOTE — PROGRESS NOTES
mechanics. Progressed resistance, range, repetitions and complexity of movement as indicated.  MANUAL THERAPY: (see below for minutes): Joint mobilization and Soft tissue mobilization was utilized and necessary because of the patient's restricted joint motion, painful spasm, loss of articular motion and restricted motion of soft tissue.   MODALITIES: (see below for minutes): to decrease pain   SELF CARE: (see below for minutes): Procedure(s) (per grid) utilized to improve and/or restore self-care/home management as related to dressing, bathing and grooming. Required minimal verbal cueing to facilitate activities of daily living skills and compensatory activities    Date: 1-22-24  (EVAL)  1-30-24  (Visit 2)  2-6-24  (Visit 3)  2-13-24  (Visit 4 )  2-20-24  (Visit 5)  2-27-24  (Visit 6)  3-5-24  (Visit 7)  3-12-24  (Visit 8)  3-27-24  (Visit 9)  4-17-24  (Visit 10)  4-23-24  (Visit 11)  4-30-24  (Visit 12)  5-6-24  (Visit 13)  5-9-24  (Visit 14)  5-14-24  (Visit 15)    Modalities:      15 mins                   L knee IFC with HP x 15 mins                                                 Therapeutic Exercise:  20 mins  15 mins  15 mins  15 mins  30 mins  30 mins  15 mins  15 mins  15 mins  15 mins  15 mins    15 mins    TFL stretch reverse on table   2x10SH (unable to feel a stretch)     Following needling for the ant hip flexor and transverse abdominals on R side  Stretching for the TFL off the table and figure 4 over the table in standing  Repeat  Repeat  Repeat  Repeat     Repeat    SL clamshells   X20     Standing clams (pt doesn't feel this)   Sl clamshells x20 following needling  Repeat  Repeat         Bridging   X20  Repeat   On wall double leg x 10   On chair single leg x 10  Pt feels a pop in the knee with this which is relieving some of the pain in the knee  Figure 4 stretching   Repeat         Pelvic tilts   X20  X10    X10             SL thoracic rotation    X5 bilateral   Repeat     Standing thoracic

## 2024-05-17 ENCOUNTER — HOSPITAL ENCOUNTER (OUTPATIENT)
Dept: PHYSICAL THERAPY | Age: 54
Setting detail: RECURRING SERIES
Discharge: HOME | End: 2024-05-20
Payer: MEDICARE

## 2024-05-17 PROCEDURE — 97140 MANUAL THERAPY 1/> REGIONS: CPT

## 2024-05-17 PROCEDURE — 97110 THERAPEUTIC EXERCISES: CPT

## 2024-05-17 NOTE — PROGRESS NOTES
Madeline Lynne  : 1970  Primary: [employer] (Medicare Managed)  Secondary:  [facility] [place]  [place] [address]  Phone: [phone]  Fax: [phone] Plan Frequency: 1-2 times a week    Plan of Care/Certification Expiration Date: 24        Plan of Care/Certification Expiration Date:  Plan of Care/Certification Expiration Date: 24    Frequency/Duration:   Plan Frequency: 1-2 times a week      Time In/Out:   Time In: 1130  Time Out: 1215      PT Visit Info:    Plan Frequency: 1-2 times a week      Visit Count:  29    OUTPATIENT PHYSICAL THERAPY:   Treatment Note   2024                                                  Episode  (PT thoracic pain)               Treatment Diagnosis:    Left knee pain, unspecified chronicity  Difficulty in walking, not elsewhere classified  Pain in thoracic spine  DDD (degenerative disc disease), cervical  Medical/Referring Diagnosis:    DDD (degenerative disc disease), cervical [M50.30]  Chondromalacia of left knee [M94.262]  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]      Referring Physician:  Rowena Garcia MD MD Orders:  PT Eval and Treat   Return MD Appt:  tbd   Date of Onset:  Onset Date: 22     Allergies:   Latex, Aspirin, Amoxicillin, Erythromycin, Mometasone, Montelukast, and Petrolatum  Restrictions/Precautions:   None      Interventions Planned (Treatment may consist of any combination of the following):  Current Treatment Recommendations: Strengthening; ROM; Endurance training; Balance training; Gait training; Stair training; Neuromuscular re-education; Manual; Pain management; Home exercise program; Dry needling; Positioning; Therapeutic activities    See Assessment Note    Subjective Comments:  \"I feel better in the neck and the back and I just feel the same spot on the front of the R hip.\"     Initial Pain Level::     1 /10  Post Session Pain Level:       1 /10  Medications

## 2024-05-21 ENCOUNTER — HOSPITAL ENCOUNTER (OUTPATIENT)
Dept: PHYSICAL THERAPY | Age: 54
Setting detail: RECURRING SERIES
Discharge: HOME | End: 2024-05-24
Payer: MEDICARE

## 2024-05-21 PROCEDURE — 97110 THERAPEUTIC EXERCISES: CPT

## 2024-05-21 PROCEDURE — 97140 MANUAL THERAPY 1/> REGIONS: CPT

## 2024-05-21 NOTE — PROGRESS NOTES
Madeline Lynne  : 1970  Primary: [employer] (Medicare Managed)  Secondary:  [facility] [place]  [place] [address]  Phone: [phone]  Fax: [phone] Plan Frequency: 1-2 times a week    Plan of Care/Certification Expiration Date: 24        Plan of Care/Certification Expiration Date:  Plan of Care/Certification Expiration Date: 24    Frequency/Duration:   Plan Frequency: 1-2 times a week      Time In/Out:   Time In: 1145  Time Out: 1230      PT Visit Info:    Plan Frequency: 1-2 times a week      Visit Count:  30    OUTPATIENT PHYSICAL THERAPY:   Treatment Note   2024                                                  Episode  (PT thoracic pain)               Treatment Diagnosis:    Left knee pain, unspecified chronicity  Difficulty in walking, not elsewhere classified  Pain in thoracic spine  DDD (degenerative disc disease), cervical  Medical/Referring Diagnosis:    DDD (degenerative disc disease), cervical [M50.30]  Chondromalacia of left knee [M94.262]  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]      Referring Physician:  Rowena Garcia MD MD Orders:  PT Eval and Treat   Return MD Appt:  tbd   Date of Onset:  Onset Date: 22     Allergies:   Latex, Aspirin, Amoxicillin, Erythromycin, Mometasone, Montelukast, and Petrolatum  Restrictions/Precautions:   None      Interventions Planned (Treatment may consist of any combination of the following):  Current Treatment Recommendations: Strengthening; ROM; Endurance training; Balance training; Gait training; Stair training; Neuromuscular re-education; Manual; Pain management; Home exercise program; Dry needling; Positioning; Therapeutic activities    See Assessment Note    Subjective Comments:  \"Im feeling the tightness in the R hip and under the pelvis as well in the R side of the lower back and into the R hip flexor.\"     Initial Pain Level::     5 /10  Post Session Pain

## 2024-05-24 ENCOUNTER — HOSPITAL ENCOUNTER (OUTPATIENT)
Dept: PHYSICAL THERAPY | Age: 54
Setting detail: RECURRING SERIES
Discharge: HOME | End: 2024-05-27
Payer: MEDICARE

## 2024-05-24 PROCEDURE — 97110 THERAPEUTIC EXERCISES: CPT

## 2024-05-24 PROCEDURE — 97140 MANUAL THERAPY 1/> REGIONS: CPT

## 2024-05-24 NOTE — THERAPY DISCHARGE
and hip into the R hip flexor followed by stretching     Seated UT needing L side across scap border       R posterior hip and paraspinal R side followed by R anterior pelvis followed by STM  Repeat                    Functional Activities:                                                                                    Treatment/Session Summary:    Treatment Assessment:   Continue with stretching and will be doing her HEP while gone on vacation. Pt is going on vacation and she will be returning in July.     >Total Treatment Billable Duration:  45  minutes   Time In: 1045  Time Out: 1130    Teena Jennings PT         Charge Capture  [facility] Portal  Appt Desk     Future Appointments   Date Time Provider Department Center   5/12/2025  1:30 PM Pradip Wagner MD [facility]

## 2024-07-12 ENCOUNTER — HOSPITAL ENCOUNTER (OUTPATIENT)
Dept: PHYSICAL THERAPY | Age: 54
Setting detail: RECURRING SERIES
Discharge: HOME | End: 2024-07-15
Payer: MEDICARE

## 2024-07-12 ENCOUNTER — APPOINTMENT (OUTPATIENT)
Dept: PHYSICAL THERAPY | Age: 54
End: 2024-07-12
Payer: MEDICARE

## 2024-07-12 DIAGNOSIS — M25.551 PAIN IN RIGHT HIP: Primary | ICD-10-CM

## 2024-07-12 DIAGNOSIS — M25.562 LEFT KNEE PAIN, UNSPECIFIED CHRONICITY: ICD-10-CM

## 2024-07-12 DIAGNOSIS — M54.6 PAIN IN THORACIC SPINE: ICD-10-CM

## 2024-07-12 DIAGNOSIS — M54.50 ACUTE BILATERAL LOW BACK PAIN WITHOUT SCIATICA: ICD-10-CM

## 2024-07-12 PROCEDURE — 97162 PT EVAL MOD COMPLEX 30 MIN: CPT

## 2024-07-12 NOTE — THERAPY EVALUATION
Madeline Lynne  : 1970  Primary: [employer] (Medicare Managed)  Secondary:  [facility] [place] [address]  Phone: [phone]  Fax: [phone] Plan Frequency: 1-2 times a week    Plan of Care/Certification Expiration Date: 10/10/24        Plan of Care/Certification Expiration Date:  Plan of Care/Certification Expiration Date: 10/10/24    Frequency/Duration:   Plan Frequency: 1-2 times a week      Time In/Out:   Time In: 1115  Time Out: 1200      PT Visit Info:         Visit Count:  32                OUTPATIENT PHYSICAL THERAPY:             Initial Assessment 2024               Episode (thoracic pain/hip pain )         Treatment Diagnosis:    Pain in right hip  Pain in thoracic spine  Left knee pain, unspecified chronicity  Acute bilateral low back pain without sciatica  Medical/Referring Diagnosis:    DDD (degenerative disc disease), cervical [M50.30]  Chondromalacia of left knee [M94.262]  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 She also has some pain in the L knee which seems to be mostly arthritis and DDD.    L sided thoracic paraspinals more than the R side. The R hip is still giving her some trouble since returning from the trip. She was doing well with the hip while she was gone so she is thinking it might be her bed at home. Pt went on a cross country trip in her RV with her  and was gone for a month. She was

## 2024-07-12 NOTE — PROGRESS NOTES
Madeline Lynne  : 1970  Primary: [employer] (Medicare Managed)  Secondary:  [facility] [place] [address]  Phone: [phone]  Fax: [phone] Plan Frequency: 1-2 times a week    Plan of Care/Certification Expiration Date: 10/10/24        Plan of Care/Certification Expiration Date:  Plan of Care/Certification Expiration Date: 10/10/24    Frequency/Duration:   Plan Frequency: 1-2 times a week      Time In/Out:   Time In: 1115  Time Out: 1200      PT Visit Info:         Visit Count:  32    OUTPATIENT PHYSICAL THERAPY:   Treatment Note 2024       Episode  (thoracic pain/hip pain )               Treatment Diagnosis:    Pain in right hip  Pain in thoracic spine  Left knee pain, unspecified chronicity  Acute bilateral low back pain without sciatica  Medical/Referring Diagnosis:    DDD (degenerative disc disease), cervical [M50.30]  Chondromalacia of left knee [M94.262]  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]      Referring Physician:  Rowena Garcia MD MD Orders:  PT Eval and Treat   Return MD Appt:  tbd   Date of Onset:  Onset Date: 22   Acute on chronic episode   Allergies:   Latex, Aspirin, Amoxicillin, Erythromycin, Mometasone, Montelukast, and Petrolatum  Restrictions/Precautions:   None      Interventions Planned (Treatment may consist of any combination of the following):  Current Treatment Recommendations: Strengthening; ROM; Endurance training; Balance training; Gait training; Stair training; Neuromuscular re-education; Manual; Pain management; Home exercise program; Dry needling; Positioning; Therapeutic activities    See Assessment Note    Subjective Comments:   \"I want to be able to return to how I was before my trip and not hurt.\"   Initial Pain Level::     3 /10  Post Session Pain Level:       3 /10  Medications Last Reviewed:  2024  Updated Objective Findings:  See Evaluation Note from today  Treatment

## 2024-07-16 ENCOUNTER — APPOINTMENT (OUTPATIENT)
Dept: PHYSICAL THERAPY | Age: 54
End: 2024-07-16
Payer: MEDICARE

## 2024-07-19 ENCOUNTER — APPOINTMENT (OUTPATIENT)
Dept: PHYSICAL THERAPY | Age: 54
End: 2024-07-19
Payer: MEDICARE

## 2024-07-25 ENCOUNTER — HOSPITAL ENCOUNTER (OUTPATIENT)
Dept: PHYSICAL THERAPY | Age: 54
Setting detail: RECURRING SERIES
Discharge: HOME | End: 2024-07-28
Payer: MEDICARE

## 2024-07-25 PROCEDURE — 97110 THERAPEUTIC EXERCISES: CPT

## 2024-07-25 PROCEDURE — 97140 MANUAL THERAPY 1/> REGIONS: CPT

## 2024-07-25 NOTE — PROGRESS NOTES
Madeline Lynne  : 1970  Primary: [employer] (Medicare Managed)  Secondary:  [facility] [place]  [place] [address]  Phone: [phone]  Fax: [phone] Plan Frequency: 1-2 times a week    Plan of Care/Certification Expiration Date: 10/10/24        Plan of Care/Certification Expiration Date:  Plan of Care/Certification Expiration Date: 10/10/24    Frequency/Duration:   Plan Frequency: 1-2 times a week      Time In/Out:   Time In: 0200  Time Out: 0245      PT Visit Info:         Visit Count:  33    OUTPATIENT PHYSICAL THERAPY:   Treatment Note 2024       Episode  (thoracic pain/hip pain )               Treatment Diagnosis:    Pain in right hip  Pain in thoracic spine  Left knee pain, unspecified chronicity  Acute bilateral low back pain without sciatica  Medical/Referring Diagnosis:    DDD (degenerative disc disease), cervical [M50.30]  Chondromalacia of left knee [M94.262]  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]      Referring Physician:  Rowena Garcia MD MD Orders:  PT Eval and Treat   Return MD Appt:  tbd   Date of Onset:  Onset Date: 22   Acute on chronic episode   Allergies:   Latex, Aspirin, Amoxicillin, Erythromycin, Mometasone, Montelukast, and Petrolatum  Restrictions/Precautions:   None      Interventions Planned (Treatment may consist of any combination of the following):  Current Treatment Recommendations: Strengthening; ROM; Endurance training; Balance training; Gait training; Stair training; Neuromuscular re-education; Manual; Pain management; Home exercise program; Dry needling; Positioning; Therapeutic activities    See Assessment Note    Subjective Comments:   \"Im having some pain in the R hip and just one area in my back today. I felt great the other day!.\"   Initial Pain Level::     3 10  Post Session Pain Level:       3 /10  Medications Last Reviewed:  2024  Updated Objective Findings:  None

## 2024-07-26 ENCOUNTER — APPOINTMENT (OUTPATIENT)
Dept: PHYSICAL THERAPY | Age: 54
End: 2024-07-26
Payer: MEDICARE

## 2024-07-30 ENCOUNTER — HOSPITAL ENCOUNTER (OUTPATIENT)
Dept: PHYSICAL THERAPY | Age: 54
Setting detail: RECURRING SERIES
Discharge: HOME | End: 2024-08-02
Payer: MEDICARE

## 2024-07-30 PROCEDURE — 97140 MANUAL THERAPY 1/> REGIONS: CPT

## 2024-07-30 PROCEDURE — 97110 THERAPEUTIC EXERCISES: CPT

## 2024-07-30 NOTE — PROGRESS NOTES
Madeline Lynne  : 1970  Primary: [employer] (Medicare Managed)  Secondary:  [facility] [place] [address]  Phone: [phone]  Fax: [phone]      Plan of Care/Certification Expiration Date: 10/10/24        Plan of Care/Certification Expiration Date:  Plan of Care/Certification Expiration Date: 10/10/24    Frequency/Duration:          Time In/Out:   Time In: 0330  Time Out: 0430      PT Visit Info:         Visit Count:  3    OUTPATIENT PHYSICAL THERAPY:   Treatment Note 2024       Episode  (thoracic pain/hip pain )               Treatment Diagnosis:    Pain in right hip  Pain in thoracic spine  Left knee pain, unspecified chronicity  Acute bilateral low back pain without sciatica  Medical/Referring Diagnosis:    DDD (degenerative disc disease), cervical [M50.30]  Chondromalacia of left knee [M94.262]  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]      Referring Physician:  Rowena Garcia MD MD Orders:  PT Eval and Treat   Return MD Appt:  tbd   Date of Onset:  Onset Date: 22   Acute on chronic episode   Allergies:   Latex, Aspirin, Amoxicillin, Erythromycin, Mometasone, Montelukast, and Petrolatum  Restrictions/Precautions:   None      Interventions Planned (Treatment may consist of any combination of the following):       See Assessment Note    Subjective Comments:   \"Im feeling better in the lower back and the hip is still bothering me but the shoulder blade is bothering me on the L side.\"   Initial Pain Level::     3 /10  Post Session Pain Level:       3 /10  Medications Last Reviewed:  2024  Updated Objective Findings:  None Today  Treatment   TREATMENT:   THERAPEUTIC ACTIVITY: ( see below for minutes): Therapeutic activities per grid below to improve mobility, strength, balance and coordination. Required minimal visual, verbal, manual and tactile cues to improve independence and safety with daily activities

## 2024-08-05 ENCOUNTER — APPOINTMENT (OUTPATIENT)
Dept: PHYSICAL THERAPY | Age: 54
End: 2024-08-05
Payer: MEDICARE

## 2024-08-07 ENCOUNTER — HOSPITAL ENCOUNTER (OUTPATIENT)
Dept: PHYSICAL THERAPY | Age: 54
Setting detail: RECURRING SERIES
Discharge: HOME | End: 2024-08-10
Payer: MEDICARE

## 2024-08-07 PROCEDURE — 97110 THERAPEUTIC EXERCISES: CPT

## 2024-08-07 PROCEDURE — 97140 MANUAL THERAPY 1/> REGIONS: CPT

## 2024-08-07 NOTE — PROGRESS NOTES
Madeline Lynne  : 1970  Primary: [employer] (Medicare Managed)  Secondary:  [facility] @ [place] [address]  Phone: [phone]  Fax: [phone]      Plan of Care/Certification Expiration Date: 10/10/24        Plan of Care/Certification Expiration Date:  Plan of Care/Certification Expiration Date: 10/10/24    Frequency/Duration:          Time In/Out:   Time In: 1145  Time Out: 1230      PT Visit Info:         Visit Count:  4    OUTPATIENT PHYSICAL THERAPY:   Treatment Note 2024       Episode  (thoracic pain/hip pain )               Treatment Diagnosis:    Pain in right hip  Pain in thoracic spine  Left knee pain, unspecified chronicity  Acute bilateral low back pain without sciatica  Medical/Referring Diagnosis:    DDD (degenerative disc disease), cervical [M50.30]  Chondromalacia of left knee [M94.262]  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]      Referring Physician:  Rowena Garcia MD MD Orders:  PT Eval and Treat   Return MD Appt:  tbd   Date of Onset:  Onset Date: 22   Acute on chronic episode   Allergies:   Latex, Aspirin, Amoxicillin, Erythromycin, Mometasone, Montelukast, and Petrolatum  Restrictions/Precautions:   None      Interventions Planned (Treatment may consist of any combination of the following):       See Assessment Note    Subjective Comments:   \"Im feeling better in the lower back and the hip is still bothering me but the shoulder blade is bothering me on the L side.\"   Initial Pain Level::     3 /10  Post Session Pain Level:       3 /10  Medications Last Reviewed:  2024  Updated Objective Findings:  None Today  Treatment   TREATMENT:   THERAPEUTIC ACTIVITY: ( see below for minutes): Therapeutic activities per grid below to improve mobility, strength, balance and coordination. Required minimal visual, verbal, manual and tactile cues to improve independence and safety with daily activities

## 2024-08-08 ENCOUNTER — APPOINTMENT (OUTPATIENT)
Dept: PHYSICAL THERAPY | Age: 54
End: 2024-08-08
Payer: MEDICARE

## 2024-08-12 ENCOUNTER — APPOINTMENT (OUTPATIENT)
Dept: PHYSICAL THERAPY | Age: 54
End: 2024-08-12
Payer: MEDICARE

## 2024-08-13 ENCOUNTER — HOSPITAL ENCOUNTER (OUTPATIENT)
Dept: PHYSICAL THERAPY | Age: 54
Setting detail: RECURRING SERIES
Discharge: HOME | End: 2024-08-16
Payer: MEDICARE

## 2024-08-13 PROCEDURE — 97140 MANUAL THERAPY 1/> REGIONS: CPT

## 2024-08-13 PROCEDURE — 97110 THERAPEUTIC EXERCISES: CPT

## 2024-08-13 NOTE — PROGRESS NOTES
Madeline Lynne  : 1970  Primary: [employer] (Medicare Managed)  Secondary:  [facility] [place] [address]  Phone: [phone]  Fax: [phone]      Plan of Care/Certification Expiration Date: 10/10/24        Plan of Care/Certification Expiration Date:  Plan of Care/Certification Expiration Date: 10/10/24    Frequency/Duration:          Time In/Out:   Time In: 0845  Time Out: 0930      PT Visit Info:         Visit Count:  5    OUTPATIENT PHYSICAL THERAPY:   Treatment Note 2024       Episode  (thoracic pain/hip pain )               Treatment Diagnosis:    Pain in right hip  Pain in thoracic spine  Left knee pain, unspecified chronicity  Acute bilateral low back pain without sciatica  Medical/Referring Diagnosis:    DDD (degenerative disc disease), cervical [M50.30]  Chondromalacia of left knee [M94.262]  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]      Referring Physician:  Rowena Garcia MD MD Orders:  PT Eval and Treat   Return MD Appt:  tbd   Date of Onset:  Onset Date: 22   Acute on chronic episode   Allergies:   Latex, Aspirin, Amoxicillin, Erythromycin, Mometasone, Montelukast, and Petrolatum  Restrictions/Precautions:   None      Interventions Planned (Treatment may consist of any combination of the following):       See Assessment Note    Subjective Comments:   \"Im feeling it in the front and the back of the hip but it flared up again with the SLR and the SL bridging.\"   Initial Pain Level::     2 /10  Post Session Pain Level:       2 /10  Medications Last Reviewed:  2024  Updated Objective Findings:  None Today  Treatment   TREATMENT:   THERAPEUTIC ACTIVITY: ( see below for minutes): Therapeutic activities per grid below to improve mobility, strength, balance and coordination. Required minimal visual, verbal, manual and tactile cues to improve independence and safety with daily activities .  THERAPEUTIC EXERCISE:

## 2024-08-19 ENCOUNTER — APPOINTMENT (OUTPATIENT)
Dept: PHYSICAL THERAPY | Age: 54
End: 2024-08-19
Payer: MEDICARE

## 2024-08-21 ENCOUNTER — HOSPITAL ENCOUNTER (OUTPATIENT)
Dept: PHYSICAL THERAPY | Age: 54
Setting detail: RECURRING SERIES
Discharge: HOME | End: 2024-08-24
Payer: MEDICARE

## 2024-08-21 PROCEDURE — 97110 THERAPEUTIC EXERCISES: CPT

## 2024-08-21 PROCEDURE — 97140 MANUAL THERAPY 1/> REGIONS: CPT

## 2024-08-21 NOTE — PROGRESS NOTES
Madeline Lynne  : 1970  Primary: [employer] (Medicare Managed)  Secondary:  [facility] [place] [address]  Phone: [phone]  Fax: [phone]      Plan of Care/Certification Expiration Date: 10/10/24        Plan of Care/Certification Expiration Date:  Plan of Care/Certification Expiration Date: 10/10/24    Frequency/Duration:          Time In/Out:   Time In: 1145  Time Out: 1230      PT Visit Info:         Visit Count:  6    OUTPATIENT PHYSICAL THERAPY:   Treatment Note 2024       Episode  (thoracic pain/hip pain )               Treatment Diagnosis:    Pain in right hip  Pain in thoracic spine  Left knee pain, unspecified chronicity  Acute bilateral low back pain without sciatica  Medical/Referring Diagnosis:    DDD (degenerative disc disease), cervical [M50.30]  Chondromalacia of left knee [M94.262]  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]      Referring Physician:  Rowena Garcia MD MD Orders:  PT Eval and Treat   Return MD Appt:  tbd   Date of Onset:  Onset Date: 22   Acute on chronic episode   Allergies:   Latex, Aspirin, Amoxicillin, Erythromycin, Mometasone, Montelukast, and Petrolatum  Restrictions/Precautions:   None      Interventions Planned (Treatment may consist of any combination of the following):       See Assessment Note    Subjective Comments:   \"Im doing the stretches at home and I'm thinking I'm making it worse.\"   Initial Pain Level::     2 /10  Post Session Pain Level:       2 /10  Medications Last Reviewed:  2024  Updated Objective Findings:  None Today  Treatment   TREATMENT:   THERAPEUTIC ACTIVITY: ( see below for minutes): Therapeutic activities per grid below to improve mobility, strength, balance and coordination. Required minimal visual, verbal, manual and tactile cues to improve independence and safety with daily activities .  THERAPEUTIC EXERCISE: (see below for minutes): Exercises per

## 2024-08-22 ENCOUNTER — APPOINTMENT (OUTPATIENT)
Dept: PHYSICAL THERAPY | Age: 54
End: 2024-08-22
Payer: MEDICARE

## 2024-08-23 ENCOUNTER — HOSPITAL ENCOUNTER (OUTPATIENT)
Dept: PHYSICAL THERAPY | Age: 54
Setting detail: RECURRING SERIES
Discharge: HOME | End: 2024-08-26
Payer: MEDICARE

## 2024-08-23 PROCEDURE — 97110 THERAPEUTIC EXERCISES: CPT

## 2024-08-23 PROCEDURE — 97140 MANUAL THERAPY 1/> REGIONS: CPT

## 2024-08-23 NOTE — PROGRESS NOTES
Madeline Lynne  : 1970  Primary: [employer] (Medicare Managed)  Secondary:  [facility] [place] [address]  Phone: [phone]  Fax: [phone]      Plan of Care/Certification Expiration Date: 10/10/24        Plan of Care/Certification Expiration Date:  Plan of Care/Certification Expiration Date: 10/10/24    Frequency/Duration:          Time In/Out:   Time In: 0915  Time Out: 1000      PT Visit Info:         Visit Count:  7    OUTPATIENT PHYSICAL THERAPY:   Treatment Note 2024       Episode  (thoracic pain/hip pain )               Treatment Diagnosis:    Pain in right hip  Pain in thoracic spine  Left knee pain, unspecified chronicity  Acute bilateral low back pain without sciatica  Medical/Referring Diagnosis:    DDD (degenerative disc disease), cervical [M50.30]  Chondromalacia of left knee [M94.262]  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]      Referring Physician:  Rowena Garcia MD MD Orders:  PT Eval and Treat   Return MD Appt:  tbd   Date of Onset:  Onset Date: 22   Acute on chronic episode   Allergies:   Latex, Aspirin, Amoxicillin, Erythromycin, Mometasone, Montelukast, and Petrolatum  Restrictions/Precautions:   None      Interventions Planned (Treatment may consist of any combination of the following):       See Assessment Note    Subjective Comments:   \"I think I have been engaging all wrong and maybe that has caused it to flare up.\"   Initial Pain Level::     2 /10  Post Session Pain Level:       2 /10  Medications Last Reviewed:  2024  Updated Objective Findings:  None Today  Treatment   TREATMENT:   THERAPEUTIC ACTIVITY: ( see below for minutes): Therapeutic activities per grid below to improve mobility, strength, balance and coordination. Required minimal visual, verbal, manual and tactile cues to improve independence and safety with daily activities .  THERAPEUTIC EXERCISE: (see below for minutes):  the hip, strengthening and stability of the L knee, dry needling and massage for the L side of the thoracic spine and core stability for the lower back.    >Total Treatment Billable Duration:  45 minutes   Time In: 0915  Time Out: 1000    Teena Jennings PT         Charge Capture  Events  [employer] Portal  Appt Desk  Attendance Report     Future Appointments   Date Time Provider Department Center   8/28/2024 11:45 AM Teena Jennings PT [facility]   8/30/2024 10:45 AM Teena Jennings PT [facility]   9/5/2024 11:45 AM Teena Jennings, PT [facility]   9/12/2024 11:45 AM Teena Jennings, PT [facility]   9/19/2024  2:45 PM Teena Jennings, PT [facility]   9/26/2024 11:45 AM Teena Jennings, PT [facility]   5/12/2025  1:30 PM Pradip Wagner MD [facility]

## 2024-08-26 ENCOUNTER — TRANSCRIBE ORDERS (OUTPATIENT)
Dept: SCHEDULING | Age: 54
End: 2024-08-26

## 2024-08-26 ENCOUNTER — APPOINTMENT (OUTPATIENT)
Dept: PHYSICAL THERAPY | Age: 54
End: 2024-08-26
Payer: MEDICARE

## 2024-08-26 DIAGNOSIS — Z12.31 VISIT FOR SCREENING MAMMOGRAM: Primary | ICD-10-CM

## 2024-08-26 NOTE — PROGRESS NOTES
I am accessing Ms. Lynne's chart as a part of our department's internal chart auditing process.  I certify that Ms. Lynne is, or was, a patient in our department.  Thank you,  Gelacio Bartlett, PT  8/26/2024

## 2024-08-28 ENCOUNTER — HOSPITAL ENCOUNTER (OUTPATIENT)
Dept: PHYSICAL THERAPY | Age: 54
Setting detail: RECURRING SERIES
Discharge: HOME | End: 2024-08-31
Payer: MEDICARE

## 2024-08-28 PROCEDURE — 97110 THERAPEUTIC EXERCISES: CPT

## 2024-08-28 PROCEDURE — 97140 MANUAL THERAPY 1/> REGIONS: CPT

## 2024-08-28 NOTE — PROGRESS NOTES
Madeline Lynne  : 1970  Primary: [employer] (Medicare Managed)  Secondary:  [facility] @ [place] [address]  Phone: [phone]  Fax: [phone]      Plan of Care/Certification Expiration Date: 10/10/24        Plan of Care/Certification Expiration Date:  Plan of Care/Certification Expiration Date: 10/10/24    Frequency/Duration:          Time In/Out:   Time In: 1145  Time Out: 1230      PT Visit Info:         Visit Count:  8    OUTPATIENT PHYSICAL THERAPY:   Treatment Note 2024       Episode  (thoracic pain/hip pain )               Treatment Diagnosis:    Pain in right hip  Pain in thoracic spine  Left knee pain, unspecified chronicity  Acute bilateral low back pain without sciatica  Medical/Referring Diagnosis:    DDD (degenerative disc disease), cervical [M50.30]  Chondromalacia of left knee [M94.262]  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]      Referring Physician:  Rowena Garcia MD MD Orders:  PT Eval and Treat   Return MD Appt:  tbd   Date of Onset:  Onset Date: 22   Acute on chronic episode   Allergies:   Latex, Aspirin, Amoxicillin, Erythromycin, Mometasone, Montelukast, and Petrolatum  Restrictions/Precautions:   None      Interventions Planned (Treatment may consist of any combination of the following):       See Assessment Note    Subjective Comments:   \"I think I have been engaging all wrong and maybe that has caused it to flare up.\"   Initial Pain Level::     2 /10  Post Session Pain Level:       2 /10  Medications Last Reviewed:  2024  Updated Objective Findings:  None Today  Treatment   TREATMENT:   THERAPEUTIC ACTIVITY: ( see below for minutes): Therapeutic activities per grid below to improve mobility, strength, balance and coordination. Required minimal visual, verbal, manual and tactile cues to improve independence and safety with daily activities .  THERAPEUTIC EXERCISE: (see below for minutes):

## 2024-08-30 ENCOUNTER — HOSPITAL ENCOUNTER (OUTPATIENT)
Dept: PHYSICAL THERAPY | Age: 54
Setting detail: RECURRING SERIES
End: 2024-08-30
Payer: MEDICARE

## 2024-08-30 PROCEDURE — 97110 THERAPEUTIC EXERCISES: CPT

## 2024-08-30 NOTE — PROGRESS NOTES
Madeline Lynne  : 1970  Primary: [employer] (Medicare Managed)  Secondary:  [facility] [place] [address]  Phone: [phone]  Fax: [phone]      Plan of Care/Certification Expiration Date: 10/10/24        Plan of Care/Certification Expiration Date:  Plan of Care/Certification Expiration Date: 10/10/24    Frequency/Duration:          Time In/Out:   Time In: 1045  Time Out: 1130      PT Visit Info:         Visit Count:  9    OUTPATIENT PHYSICAL THERAPY:   Treatment Note 2024       Episode  (thoracic pain/hip pain )               Treatment Diagnosis:    Pain in right hip  Pain in thoracic spine  Left knee pain, unspecified chronicity  Acute bilateral low back pain without sciatica  Medical/Referring Diagnosis:    DDD (degenerative disc disease), cervical [M50.30]  Chondromalacia of left knee [M94.262]  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]      Referring Physician:  Rowena Garcia MD MD Orders:  PT Eval and Treat   Return MD Appt:  tbd   Date of Onset:  Onset Date: 22   Acute on chronic episode   Allergies:   Latex, Aspirin, Amoxicillin, Erythromycin, Mometasone, Montelukast, and Petrolatum  Restrictions/Precautions:   None      Interventions Planned (Treatment may consist of any combination of the following):       See Assessment Note    Subjective Comments:   \"I think I have been engaging all wrong and maybe that has caused it to flare up.\"   Initial Pain Level::     2 /10  Post Session Pain Level:       2 /10  Medications Last Reviewed:  2024  Updated Objective Findings:  None Today  Treatment   TREATMENT:   THERAPEUTIC ACTIVITY: ( see below for minutes): Therapeutic activities per grid below to improve mobility, strength, balance and coordination. Required minimal visual, verbal, manual and tactile cues to improve independence and safety with daily activities .  THERAPEUTIC EXERCISE: (see below for minutes):

## 2024-09-05 ENCOUNTER — HOSPITAL ENCOUNTER (OUTPATIENT)
Dept: PHYSICAL THERAPY | Age: 54
Setting detail: RECURRING SERIES
Discharge: HOME | End: 2024-09-08
Payer: MEDICARE

## 2024-09-05 PROCEDURE — 97110 THERAPEUTIC EXERCISES: CPT

## 2024-09-05 PROCEDURE — 97140 MANUAL THERAPY 1/> REGIONS: CPT

## 2024-09-12 ENCOUNTER — HOSPITAL ENCOUNTER (OUTPATIENT)
Dept: PHYSICAL THERAPY | Age: 54
Setting detail: RECURRING SERIES
Discharge: HOME | End: 2024-09-15
Payer: MEDICARE

## 2024-09-12 PROCEDURE — 97140 MANUAL THERAPY 1/> REGIONS: CPT

## 2024-09-12 PROCEDURE — 97110 THERAPEUTIC EXERCISES: CPT

## 2024-09-19 ENCOUNTER — APPOINTMENT (OUTPATIENT)
Dept: PHYSICAL THERAPY | Age: 54
End: 2024-09-19
Payer: MEDICARE

## 2024-09-26 ENCOUNTER — APPOINTMENT (OUTPATIENT)
Dept: PHYSICAL THERAPY | Age: 54
End: 2024-09-26
Payer: MEDICARE

## 2024-10-24 ENCOUNTER — HOSPITAL ENCOUNTER (OUTPATIENT)
Dept: PHYSICAL THERAPY | Age: 54
Setting detail: RECURRING SERIES
Discharge: HOME | End: 2024-10-27
Payer: MEDICARE

## 2024-10-24 PROCEDURE — 97110 THERAPEUTIC EXERCISES: CPT

## 2024-10-24 PROCEDURE — 97140 MANUAL THERAPY 1/> REGIONS: CPT

## 2024-10-24 NOTE — THERAPY RECERTIFICATION
Madeline Lynne  : 1970  Primary: [employer] (Medicare Managed)  Secondary:  [facility] @ [place] [address]  Phone: [phone]  Fax: [phone] Plan Frequency: 1 time a week    Plan of Care/Certification Expiration Date: 12/15/24        Plan of Care/Certification Expiration Date:  Plan of Care/Certification Expiration Date: 12/15/24    Frequency/Duration:   Plan Frequency: 1 time a week      Time In/Out:   Time In: 1015  Time Out: 1100      PT Visit Info:         Visit Count:  12                OUTPATIENT PHYSICAL THERAPY:             Recertification 10/24/2024               Episode (thoracic pain/hip pain )         Treatment Diagnosis:    Pain in right hip  Pain in thoracic spine  Left knee pain, unspecified chronicity  Acute bilateral low back pain without sciatica  Medical/Referring Diagnosis:    DDD (degenerative disc disease), cervical [M50.30]  Chondromalacia of left knee [M94.262]  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 She also has some pain in the L knee which seems to be mostly arthritis and DDD.    L sided thoracic paraspinals more than the R side. The R hip is still giving her some trouble since returning from the trip. She was doing well with the hip while she was gone so she is thinking it might be her bed at home. Pt went on a cross country trip in her RV with her  and was gone for a month. She was doing her HEP and walking a

## 2024-10-24 NOTE — PROGRESS NOTES
8-30-24  (Visit 9)  9-5-24  (Visit 10)  9-12-24  (Visit 11)  10-24-24  (Visit 12)    Modalities:                                                            Therapeutic Exercise:   30 mins  15  mins  15 mins  30 mins  30 mins  15 mins  45 mins  15 mins  15 mins  30 mins       Cross over marching with rotation (TA)  Repeat working on HEP. Changed HEP  Changed HEP again    Reviewed abdominal activation in standing  Repeat  Reviewed HEP and forward hinge at hips with HS activation Abdominal bracing with cuff x 10 working on 360 ° breathing       Cross over punch with green band (HEP)   Isometric hip abduction seated next to wall x 10  Transverse abdominal activation with supine marching  Repeat  Reviewed HEP with abdominal bracing supine seated and standing  Forward lean with activation of glutes and abdominals  Repeat   Single leg fall out with abdominal bracing x10 bilateral       5# to floor opp foot x 10   Hip flexor stretch with thoracic rotation and top piriformis stretch bilateral  Transverse abdominal activation with supine SLR  Prone and standing lower trap activation with scap setting and abduction  Repeat  Repeat  Repeat   Standing T's without weight working on 360 ° breathing and abdominal bracing moving to RDL SL x 10 to table         Kneeling hip flexor stretch with thoracic lean x 10SH  Transverse abdominal activation with bilateral hip fallout                                        Proprioceptive Activities:                                                            Manual Therapy:  25 mins  30 mins  30 mins  30 mins  15 mins  15 mins  30 mins   30 mins  30 mins       Dry needling to the R hip over pelvis followed by STM and AP mobilzation  Dry needling to the R hip anterior and posterior to crest followed by STM  Dry needling to the R anterior hip and posterior crest followed by STM  Dry needling to the R anterior and posterior crest static x 6 needles followed by STM  Manual trigger point deactivation

## 2024-10-29 ENCOUNTER — HOSPITAL ENCOUNTER (OUTPATIENT)
Dept: PHYSICAL THERAPY | Age: 54
Setting detail: RECURRING SERIES
Discharge: HOME | End: 2024-11-01
Payer: MEDICARE

## 2024-10-29 PROCEDURE — 97110 THERAPEUTIC EXERCISES: CPT

## 2024-10-29 NOTE — PROGRESS NOTES
Madeline Lynne  : 1970  Primary: [employer] (Medicare Managed)  Secondary:  [facility] @ [place] [address]  Phone: [phone]  Fax: [phone] Plan Frequency: 1 time a week      Plan of Care/Certification Expiration Date: 12/15/24        Plan of Care/Certification Expiration Date:  Plan of Care/Certification Expiration Date: 12/15/24    Frequency/Duration:   Plan Frequency: 1 time a week        Time In/Out:   Time In: 930  Time Out: 1015      PT Visit Info:         Visit Count:  13    OUTPATIENT PHYSICAL THERAPY:   Treatment Note  10/29/2024       Episode  (thoracic pain/hip pain )               Treatment Diagnosis:    Pain in right hip  Pain in thoracic spine  Left knee pain, unspecified chronicity  Acute bilateral low back pain without sciatica  Medical/Referring Diagnosis:    DDD (degenerative disc disease), cervical [M50.30]  Chondromalacia of left knee [M94.262]  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]      Referring Physician:  Rowena Garcia MD MD Orders:  PT Eval and Treat   Return MD Appt:  tbd   Date of Onset:  Onset Date: 22   Acute on chronic episode   Allergies:   Latex, Aspirin, Amoxicillin, Erythromycin, Mometasone, Montelukast, and Petrolatum  Restrictions/Precautions:   None      Interventions Planned (Treatment may consist of any combination of the following):       See Assessment Note    Subjective Comments:   \"I can tell the back is working with these exercises and the L  knee is giving me a little bit of discomfort but other than that my hips seem to be getting better.\"  Initial Pain Level::     2 /10  Post Session Pain Level:       2 /10  Medications Last Reviewed:  10/29/2024  Updated Objective Findings:      Goals: (Goals have been discussed and agreed upon with patient.)  Short-Term Functional Goals: Time Frame: 4 weeks   Ms. Lynne to be independent with HEP.  (MET)  Pt able to return to normal daily

## 2024-11-08 ENCOUNTER — HOSPITAL ENCOUNTER (OUTPATIENT)
Dept: PHYSICAL THERAPY | Age: 54
Setting detail: RECURRING SERIES
Discharge: HOME | End: 2024-11-11
Payer: MEDICARE

## 2024-11-08 PROCEDURE — 97110 THERAPEUTIC EXERCISES: CPT

## 2024-11-08 PROCEDURE — 97140 MANUAL THERAPY 1/> REGIONS: CPT

## 2024-11-08 NOTE — PROGRESS NOTES
Madeline Lynne  : 1970  Primary: [employer] (Medicare Managed)  Secondary:  [facility] @ [place] [address]  Phone: [phone]  Fax: [phone] Plan Frequency: 1 time a week      Plan of Care/Certification Expiration Date: 12/15/24        Plan of Care/Certification Expiration Date:  Plan of Care/Certification Expiration Date: 12/15/24    Frequency/Duration:   Plan Frequency: 1 time a week        Time In/Out:   Time In: 930  Time Out: 1015      PT Visit Info:         Visit Count:  14    OUTPATIENT PHYSICAL THERAPY:   Treatment Note  2024       Episode  (thoracic pain/hip pain )               Treatment Diagnosis:    Pain in right hip  Pain in thoracic spine  Left knee pain, unspecified chronicity  Acute bilateral low back pain without sciatica  Medical/Referring Diagnosis:    DDD (degenerative disc disease), cervical [M50.30]  Chondromalacia of left knee [M94.262]  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]      Referring Physician:  Rowena Garcia MD MD Orders:  PT Eval and Treat   Return MD Appt:  tbd   Date of Onset:  Onset Date: 22   Acute on chronic episode   Allergies:   Latex, Aspirin, Amoxicillin, Erythromycin, Mometasone, Montelukast, and Petrolatum  Restrictions/Precautions:   None      Interventions Planned (Treatment may consist of any combination of the following):       See Assessment Note    Subjective Comments:   \"I can tell the back is working with these exercises and the L  knee is giving me a little bit of discomfort but other than that my hips seem to be getting better.\"  Initial Pain Level::     2 /10  Post Session Pain Level:       2 /10  Medications Last Reviewed:  2024  Updated Objective Findings:      Goals: (Goals have been discussed and agreed upon with patient.)  Short-Term Functional Goals: Time Frame: 4 weeks   Ms. Lynne to be independent with HEP.  (MET)  Pt able to return to normal daily

## 2024-11-14 ENCOUNTER — HOSPITAL ENCOUNTER (OUTPATIENT)
Dept: PHYSICAL THERAPY | Age: 54
Setting detail: RECURRING SERIES
Discharge: HOME | End: 2024-11-17
Payer: MEDICARE

## 2024-11-14 PROCEDURE — 97110 THERAPEUTIC EXERCISES: CPT

## 2024-11-14 NOTE — PROGRESS NOTES
Madeline Lynne  : 1970  Primary: [employer] (Medicare Managed)  Secondary:  [facility] @ [place] [address]  Phone: [phone]  Fax: [phone] Plan Frequency: 1 time a week      Plan of Care/Certification Expiration Date: 12/15/24        Plan of Care/Certification Expiration Date:  Plan of Care/Certification Expiration Date: 12/15/24    Frequency/Duration:   Plan Frequency: 1 time a week        Time In/Out:   Time In: 930  Time Out: 1015      PT Visit Info:         Visit Count:  15    OUTPATIENT PHYSICAL THERAPY:   Treatment Note  2024       Episode  (thoracic pain/hip pain )               Treatment Diagnosis:    Pain in right hip  Pain in thoracic spine  Left knee pain, unspecified chronicity  Acute bilateral low back pain without sciatica  Medical/Referring Diagnosis:    DDD (degenerative disc disease), cervical [M50.30]  Chondromalacia of left knee [M94.262]  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]      Referring Physician:  Rowena Garcia MD MD Orders:  PT Eval and Treat   Return MD Appt:  tbd   Date of Onset:  Onset Date: 22   Acute on chronic episode   Allergies:   Latex, Aspirin, Amoxicillin, Erythromycin, Mometasone, Montelukast, and Petrolatum  Restrictions/Precautions:   None      Interventions Planned (Treatment may consist of any combination of the following):       See Assessment Note    Subjective Comments:   \"Im feeling better but the knee was bothering me with the clamshells so I took those out.\"   Initial Pain Level::     0 /10  Post Session Pain Level:       0 /10  Medications Last Reviewed:  2024  Updated Objective Findings:      Goals: (Goals have been discussed and agreed upon with patient.)  Short-Term Functional Goals: Time Frame: 4 weeks   Ms. Lynne to be independent with HEP.  (MET)  Pt able to return to normal daily activities without knee pain. (MET)  Pt able to have no pain at rest in

## 2024-11-19 ENCOUNTER — HOSPITAL ENCOUNTER (OUTPATIENT)
Dept: PHYSICAL THERAPY | Age: 54
Setting detail: RECURRING SERIES
Discharge: HOME | End: 2024-11-22
Payer: MEDICARE

## 2024-11-19 PROCEDURE — 97140 MANUAL THERAPY 1/> REGIONS: CPT

## 2024-11-19 PROCEDURE — 97110 THERAPEUTIC EXERCISES: CPT

## 2024-11-19 NOTE — PROGRESS NOTES
with bilateral hip fallout        Roller for ITB and followed by standing figure 4 stretch on table   Side plank with clamshells x 5 bilateral                                           Proprioceptive Activities:                                                                            Manual Therapy:  25 mins  30 mins  30 mins  30 mins  15 mins  15 mins  30 mins   30 mins  30 mins    15 mins   30 mins      Dry needling to the R hip over pelvis followed by STM and AP mobilzation  Dry needling to the R hip anterior and posterior to crest followed by STM  Dry needling to the R anterior hip and posterior crest followed by STM  Dry needling to the R anterior and posterior crest static x 6 needles followed by STM  Manual trigger point deactivation over tranverse abdominals with 360 ° breathing  Dry needling L parapsinal of the thoracic spine around T4-T6 followed by manual mobilization of the scap  Dry needling in the L paraspinal T4-6 region followed by manual STM and mobilization   Dry needling in the L paraspinal T4-6 and lower to the L1-L3 with ES in each spot x 8 mins each followed by STM  Repeat dry needling bilateral paraspinals T4-T7 over ribs with periosteal picking    SL needling in glute min and med   Needling R hip followed by STM in SL      Dry needling in the L paraspinal T12-L1 Dry needling to the L paraspinal between scap border and T4 followed STM  Repeat    Dry needling to the R hip crest             Functional Activities:                                                                                                 FUNCTIONAL DRY NEEDLING: (5 minutes untimed):      With written consent received and precautions reviewed, instrument-assisted soft tissue mobilization was performed to:  Muscle(s): right  piriformis , glute med, lateral   Needle(s) size used: .30 x 60mm  Technique(s): use of pistoning techniques and periosteal pecking  For the purpose of: local twitch response that leads to alternation in

## 2024-11-25 ENCOUNTER — APPOINTMENT (OUTPATIENT)
Dept: PHYSICAL THERAPY | Age: 54
End: 2024-11-25
Payer: MEDICARE

## 2024-12-24 ENCOUNTER — APPOINTMENT (OUTPATIENT)
Dept: PHYSICAL THERAPY | Age: 54
End: 2024-12-24
Payer: MEDICARE

## 2024-12-30 ENCOUNTER — HOSPITAL ENCOUNTER (OUTPATIENT)
Dept: PHYSICAL THERAPY | Age: 54
Setting detail: RECURRING SERIES
Discharge: HOME | End: 2025-01-02
Payer: MEDICARE

## 2024-12-30 PROCEDURE — 97140 MANUAL THERAPY 1/> REGIONS: CPT

## 2024-12-30 NOTE — PROGRESS NOTES
7-25-24  (Visit 2)  7-30-24  (Visit 3)  8-7-24  (Visit 4)  8-13-24  (Visit 5)  8-21-24  (Visit 6)  8-23-24  (Visit 7)  8-28-24  (Visit 8)  8-30-24  (Visit 9)  9-5-24  (Visit 10)  9-12-24  (Visit 11)  10-24-24  (Visit 12)  10-29-24  (Visit 13)  11-8-24  (Visit 14)  11-14-24  (Visit 15)  11-19-24  (Visit 16)  12-30-24  (Visit 17)   Recert   Modalities:                                                                                Therapeutic Exercise:   30 mins  15  mins  15 mins  30 mins  30 mins  15 mins  45 mins  15 mins  15 mins  30 mins  45 mins  30 mins  45 mins  15 mins        Cross over marching with rotation (TA)  Repeat working on HEP. Changed HEP  Changed HEP again    Reviewed abdominal activation in standing  Repeat  Reviewed HEP and forward hinge at hips with HS activation Abdominal bracing with cuff x 10 working on 360 ° breathing  Bird dog with wand on back x 5 with legs only and then with alternating both   Shoulder diagonal with red band bilateral x 10  Repeat        Cross over punch with green band (HEP)   Isometric hip abduction seated next to wall x 10  Transverse abdominal activation with supine marching  Repeat  Reviewed HEP with abdominal bracing supine seated and standing  Forward lean with activation of glutes and abdominals  Repeat   Single leg fall out with abdominal bracing x10 bilateral  Bridging with hold and using wand for hip balance x 5  LLD with belt working into stretching into figure 4 after Anti rotation with red band x 10 bilateral  Transverse abdominal lateral stretch with 1 foot on table        5# to floor opp foot x 10   Hip flexor stretch with thoracic rotation and top piriformis stretch bilateral  Transverse abdominal activation with supine SLR  Prone and standing lower trap activation with scap setting and abduction  Repeat  Repeat  Repeat   Standing T's without weight working on 360 ° breathing and abdominal bracing moving to [facility] SL x 10 to table  Snow angels on wall

## 2024-12-30 NOTE — THERAPY RECERTIFICATION
functional limitations):    Increased Pain, Decreased Strength, Decreased Functional Mobility, Decreased [place] with Home Exercise Program, Decreased Posture, Decreased Body Mechanics, and Decreased Activity Tolerance/Endurance*     Therapy Prognosis:     Therapy Prognosis: Excellent      Initial Assessment Complexity:     Decision Making: Low Complexity        PLAN   Effective Dates: 7/12/2024 TO Plan of Care/Certification Expiration Date: 01/29/25     Frequency/Duration: Plan Frequency: 1 time a week      Interventions Planned (Treatment may consist of any combination of the following):    Functional Mobility Training, Home Exercise Program (HEP), Manual Therapy, Pain Management, Positioning, Modalities:,   Heat/Cold,   Ultrasound, and   E-stim - unattended, Therapeutic Activites, and Therapeutic Exercise/Strengthening     Goals: (Goals have been discussed and agreed upon with patient.)  Short-Term Functional Goals: Time Frame: 4 weeks   Ms. Lynne to be independent with HEP. (MET)  Pt able to return to normal daily activities without knee pain. (MET)  Pt able to have no pain at rest in the thoracic spine and lower back. (Able to maintain but still having some trouble due to h/o scoliosis)   Discharge Goals: Time Frame: 12 weeks   Pt to have no pain in the R hip at rest 100% of time. (MET)  Pt able to return to daily walking program without pain in the knee or back. (MET)  Pt able to perform cleaning and exercises without pain in the knee or back. (MET)  Pt able to engage core and back muscles properly without pain during all functional activities. (MET)  Pt able to have no pain in the knee and the ITB with VR headset 100% of time. (MET)       Outcome Measure:   Tool Used: Lower Extremity Functional Scale (LEFS)  Score:  Initial: 60/80 Most Recent: 41/80 (Date: 10-24-24 )  * (Date: 12-30-24)    Interpretation of Score: 20 questions each scored on a 5 point scale with 0 representing \"extreme difficulty or

## 2025-01-10 ENCOUNTER — HOSPITAL ENCOUNTER (OUTPATIENT)
Dept: PHYSICAL THERAPY | Age: 55
Setting detail: RECURRING SERIES
Discharge: HOME | End: 2025-01-13
Payer: MEDICARE

## 2025-01-10 PROCEDURE — 97140 MANUAL THERAPY 1/> REGIONS: CPT

## 2025-01-10 NOTE — PROGRESS NOTES
Therapeutic Exercise: 15 mins  15 mins  30 mins  45 mins  30 mins  45 mins  15 mins       Repeat  Reviewed HEP and forward hinge at hips with HS activation Abdominal bracing with cuff x 10 working on 360 ° breathing  Bird dog with wand on back x 5 with legs only and then with alternating both   Shoulder diagonal with red band bilateral x 10  Repeat       Repeat   Single leg fall out with abdominal bracing x10 bilateral  Bridging with hold and using wand for hip balance x 5  LLD with belt working into stretching into figure 4 after Anti rotation with red band x 10 bilateral  Transverse abdominal lateral stretch with 1 foot on table       Repeat   Standing T's without weight working on 360 ° breathing and abdominal bracing moving to RDL SL x 10 to table  Snow angels on wall working on core activation and pelvic tilt Hip flexor in figure 4 with downward pressure into extension working on sartorius  Serratus slides with red band x 10 on wall           Roller for ITB and followed by standing figure 4 stretch on table   Side plank with clamshells x 5 bilateral                               Proprioceptive Activities:                                                Manual Therapy: 30 mins  30 mins    15 mins   30 mins  45 mins  45 mins     Dry needling in the L paraspinal T4-6 and lower to the L1-L3 with ES in each spot x 8 mins each followed by STM  Repeat dry needling bilateral paraspinals T4-T7 over ribs with periosteal picking    SL needling in glute min and med   Needling R hip followed by STM in SL  Needling in the mid thoracic followed by STM for the paraspinals  Needling in the paraspinals on the L and the R with more on the L from T3-T12 followed by STM and mobilization            Needling in the L side levator scapular region followed by STM and PROM     Functional Activities:                                                              FUNCTIONAL DRY NEEDLING: (5 minutes untimed):

## 2025-01-16 ENCOUNTER — HOSPITAL ENCOUNTER (OUTPATIENT)
Dept: PHYSICAL THERAPY | Age: 55
Setting detail: RECURRING SERIES
Discharge: HOME | End: 2025-01-19
Payer: MEDICARE

## 2025-01-16 PROCEDURE — 97110 THERAPEUTIC EXERCISES: CPT

## 2025-01-16 PROCEDURE — 97140 MANUAL THERAPY 1/> REGIONS: CPT

## 2025-01-16 NOTE — PROGRESS NOTES
Madeline Lynne  : 1970  Primary: [employer] (Medicare Managed)  Secondary:  [facility] @ [place] [address]  Phone: [phone]  Fax: [phone] Plan Frequency: 1 time a week      Plan of Care/Certification Expiration Date: 25        Plan of Care/Certification Expiration Date:  Plan of Care/Certification Expiration Date: 25    Frequency/Duration:   Plan Frequency: 1 time a week        Time In/Out:   Time In: 0414  Time Out: 0505      PT Visit Info:         Visit Count:  19    OUTPATIENT PHYSICAL THERAPY:   Treatment Note  2025       Episode  (thoracic pain/hip pain )               Treatment Diagnosis:    Pain in right hip  Pain in thoracic spine  Left knee pain, unspecified chronicity  Acute bilateral low back pain without sciatica  Medical/Referring Diagnosis:    DDD (degenerative disc disease), cervical [M50.30]  Chondromalacia of left knee [M94.262]  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]      Referring Physician:  Rowena Garcia MD MD Orders:  PT Eval and Treat   Return MD Appt:  tbd   Date of Onset:  Onset Date: 22   Acute on chronic episode   Allergies:   Latex, Aspirin, Amoxicillin, Erythromycin, Mometasone, Montelukast, and Petrolatum  Restrictions/Precautions:   None      Interventions Planned (Treatment may consist of any combination of the following):       See Assessment Note    Subjective Comments:   \"Im going next Thursday for MRI because the xrays were showing   Initial Pain Level::     0 /10  Post Session Pain Level:       0 /10  Medications Last Reviewed:  2025  Updated Objective Findings:      Goals: (Goals have been discussed and agreed upon with patient.)  Short-Term Functional Goals: Time Frame: 4 weeks   Ms. Lynne to be independent with HEP.  (MET)  Pt able to return to normal daily activities without knee pain. (MET)  Pt able to have no pain at rest in the thoracic spine and lower

## 2025-01-28 ENCOUNTER — HOSPITAL ENCOUNTER (OUTPATIENT)
Dept: PHYSICAL THERAPY | Age: 55
Setting detail: RECURRING SERIES
Discharge: HOME | End: 2025-01-31
Payer: MEDICARE

## 2025-01-28 PROCEDURE — 97110 THERAPEUTIC EXERCISES: CPT

## 2025-01-28 PROCEDURE — 97140 MANUAL THERAPY 1/> REGIONS: CPT

## 2025-01-28 NOTE — PROGRESS NOTES
Madeline Lynne  : 1970  Primary: [employer] (Medicare Managed)  Secondary:  [facility] @ [place] [address]  Phone: [phone]  Fax: [phone] Plan Frequency: 1 time a week      Plan of Care/Certification Expiration Date: 25        Plan of Care/Certification Expiration Date:  Plan of Care/Certification Expiration Date: 25    Frequency/Duration:   Plan Frequency: 1 time a week        Time In/Out:   Time In: 930  Time Out: 1015      PT Visit Info:         Visit Count:  20    OUTPATIENT PHYSICAL THERAPY:   Treatment Note  2025       Episode  (thoracic pain/hip pain )               Treatment Diagnosis:    Pain in right hip  Pain in thoracic spine  Left knee pain, unspecified chronicity  Acute bilateral low back pain without sciatica  Medical/Referring Diagnosis:    DDD (degenerative disc disease), cervical [M50.30]  Chondromalacia of left knee [M94.262]  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  Initial Pain Level::     0 /10  Post Session Pain Level:       0 /10  Medications Last Reviewed:  2025  Updated Objective Findings:      Goals: (Goals have been discussed and agreed upon with patient.)  Short-Term Functional Goals: Time Frame: 4 weeks   Ms. Lynne to be independent with HEP.  (MET)  Pt able to return to normal daily activities without knee pain. (MET)  Pt able to

## 2025-01-31 ENCOUNTER — HOSPITAL ENCOUNTER (OUTPATIENT)
Dept: PHYSICAL THERAPY | Age: 55
Setting detail: RECURRING SERIES
End: 2025-01-31
Payer: MEDICARE

## 2025-01-31 PROCEDURE — 97110 THERAPEUTIC EXERCISES: CPT

## 2025-01-31 PROCEDURE — 97140 MANUAL THERAPY 1/> REGIONS: CPT

## 2025-01-31 NOTE — PROGRESS NOTES
Madeline Lynne  : 1970  Primary: [employer] (Medicare Managed)  Secondary:  [facility] @ [place] [address]  Phone: [phone]  Fax: [phone] Plan Frequency: 1 time a week      Plan of Care/Certification Expiration Date: 25        Plan of Care/Certification Expiration Date:  Plan of Care/Certification Expiration Date: 25    Frequency/Duration:   Plan Frequency: 1 time a week        Time In/Out:   Time In: 1145  Time Out: 1230      PT Visit Info:         Visit Count:  21    OUTPATIENT PHYSICAL THERAPY:   Treatment Note  2025       Episode  (thoracic pain/hip pain )               Treatment Diagnosis:    Pain in right hip  Pain in thoracic spine  Left knee pain, unspecified chronicity  Acute bilateral low back pain without sciatica  cervicalgia  Medical/Referring Diagnosis:    DDD (degenerative disc disease), cervical [M50.30]  Chondromalacia of left knee [M94.262]  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]      Referring Physician:  Rowena Garcia MD MD Orders:  PT Eval and Treat   Return MD Appt:  tbd   Date of Onset:  No data recorded   Acute on chronic episode   Allergies:   Latex, Aspirin, Amoxicillin, Erythromycin, Mometasone, Montelukast, and Petrolatum  Restrictions/Precautions:   None      Interventions Planned (Treatment may consist of any combination of the following):       See Assessment Note    Subjective Comments:   \"I think we need to focus on the posture and neck as well as L knee since I will be going away for the month of April again.\"   Initial Pain Level::     0 /10  Post Session Pain Level:       0 /10  Medications Last Reviewed:  2025  Updated Objective Findings:      Goals: (Goals have been discussed and agreed upon with patient.)  Short-Term Functional Goals: Time Frame: 4 weeks   Ms. Lynne to be independent with HEP.  (MET)  Pt able to return to normal daily activities without knee pain.

## 2025-01-31 NOTE — THERAPY RECERTIFICATION
Madeline Lynne  : 1970  Primary: [employer] (Medicare Managed)  Secondary:  [facility] [place] [address]  Phone: [phone]  Fax: [phone] Plan Frequency: 1 time a week    Plan of Care/Certification Expiration Date: 25        Plan of Care/Certification Expiration Date:  Plan of Care/Certification Expiration Date: 25    Frequency/Duration:   Plan Frequency: 1 time a week      Time In/Out:   Time In: 1145  Time Out: 1230      PT Visit Info:         Visit Count:  21                OUTPATIENT PHYSICAL THERAPY:             Recertification 2025               Episode (thoracic pain/hip pain )         Treatment Diagnosis:    Pain in right hip  Pain in thoracic spine  Left knee pain, unspecified chronicity  Acute bilateral low back pain without sciatica  cervicalgia  Medical/Referring Diagnosis:    DDD (degenerative disc disease), cervical [M50.30]  Chondromalacia of left knee [M94.262]  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 She also has some pain in the L knee which seems to be mostly arthritis and DDD.    L sided thoracic paraspinals more than the R side. The R hip is still giving her some trouble since returning from the trip. She was doing well with the hip while she was gone so she is thinking it might be her bed at home. Pt went on a cross country trip in her RV with her  and was gone for a month. She was doing her HEP

## 2025-02-11 ENCOUNTER — HOSPITAL ENCOUNTER (OUTPATIENT)
Dept: PHYSICAL THERAPY | Age: 55
Setting detail: RECURRING SERIES
Discharge: HOME | End: 2025-02-14
Payer: MEDICARE

## 2025-02-11 PROCEDURE — 97140 MANUAL THERAPY 1/> REGIONS: CPT

## 2025-02-11 PROCEDURE — 97110 THERAPEUTIC EXERCISES: CPT

## 2025-02-11 NOTE — PROGRESS NOTES
Madeline Lynne  : 1970  Primary: [employer] (Medicare Managed)  Secondary:  [facility] [place] [address]  Phone: [phone]  Fax: [phone] Plan Frequency: 1 time a week      Plan of Care/Certification Expiration Date: 25        Plan of Care/Certification Expiration Date:  Plan of Care/Certification Expiration Date: 25    Frequency/Duration:   Plan Frequency: 1 time a week        Time In/Out:   Time In: 0415  Time Out: 0500      PT Visit Info:         Visit Count:  22    OUTPATIENT PHYSICAL THERAPY:   Treatment Note  2025       Episode  (thoracic pain/hip pain )               Treatment Diagnosis:    Pain in right hip  Pain in thoracic spine  Left knee pain, unspecified chronicity  Acute bilateral low back pain without sciatica  cervicalgia  Medical/Referring Diagnosis:    DDD (degenerative disc disease), cervical [M50.30]  Chondromalacia of left knee [M94.262]  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]      Referring Physician:  Rowena Garcia MD MD Orders:  PT Eval and Treat   Return MD Appt:  tbd   Date of Onset:  No data recorded   Acute on chronic episode   Allergies:   Latex, Aspirin, Amoxicillin, Erythromycin, Mometasone, Montelukast, and Petrolatum  Restrictions/Precautions:   None      Interventions Planned (Treatment may consist of any combination of the following):       See Assessment Note    Subjective Comments:   \"All the reactive areas have calmed down but I'm not sure what it was from.\"   Post Session Pain Level:       0 /10  Medications Last Reviewed:  2025  Updated Objective Findings:      Goals: (Goals have been discussed and agreed upon with patient.)  Short-Term Functional Goals: Time Frame: 4 weeks   Ms. Lynne to be independent with HEP.  (MET)  Pt able to return to normal daily activities without knee pain. (MET)  Pt able to have no pain at rest in the thoracic spine and lower back.

## 2025-02-17 ENCOUNTER — HOSPITAL ENCOUNTER (OUTPATIENT)
Dept: PHYSICAL THERAPY | Age: 55
Setting detail: RECURRING SERIES
Discharge: HOME | End: 2025-02-20
Payer: MEDICARE

## 2025-02-17 PROCEDURE — 97140 MANUAL THERAPY 1/> REGIONS: CPT

## 2025-02-17 PROCEDURE — 97110 THERAPEUTIC EXERCISES: CPT

## 2025-02-17 NOTE — PROGRESS NOTES
Madeline Lynne  : 1970  Primary: [employer] (Medicare Managed)  Secondary:  [facility] [place] [address]  Phone: [phone]  Fax: [phone] Plan Frequency: 1 time a week      Plan of Care/Certification Expiration Date: 25        Plan of Care/Certification Expiration Date:  Plan of Care/Certification Expiration Date: 25    Frequency/Duration:   Plan Frequency: 1 time a week        Time In/Out:   Time In: 1145  Time Out: 1230      PT Visit Info:         Visit Count:  23    OUTPATIENT PHYSICAL THERAPY:   Treatment Note  2025       Episode  (thoracic pain/hip pain )               Treatment Diagnosis:    Pain in right hip  Pain in thoracic spine  Left knee pain, unspecified chronicity  Acute bilateral low back pain without sciatica  cervicalgia  Medical/Referring Diagnosis:    DDD (degenerative disc disease), cervical [M50.30]  Chondromalacia of left knee [M94.262]  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]      Referring Physician:  Rowena Garcia MD MD Orders:  PT Eval and Treat   Return MD Appt:  tbd   Date of Onset:  No data recorded   Acute on chronic episode   Allergies:   Latex, Aspirin, Amoxicillin, Erythromycin, Mometasone, Montelukast, and Petrolatum  Restrictions/Precautions:   None      Interventions Planned (Treatment may consist of any combination of the following):       See Assessment Note    Subjective Comments:   \"Im having some pain in the L lower back near the spot from before.\"   Post Session Pain Level:       0 /10  Medications Last Reviewed:  2025  Updated Objective Findings:      Goals: (Goals have been discussed and agreed upon with patient.)  Short-Term Functional Goals: Time Frame: 4 weeks   Ms. Lynne to be independent with HEP.  (MET)  Pt able to return to normal daily activities without knee pain. (MET)  Pt able to have no pain at rest in the thoracic spine and lower back. (Progressing)

## 2025-02-25 ENCOUNTER — HOSPITAL ENCOUNTER (OUTPATIENT)
Dept: PHYSICAL THERAPY | Age: 55
Setting detail: RECURRING SERIES
Discharge: HOME | End: 2025-02-28
Payer: MEDICARE

## 2025-02-25 PROCEDURE — 97140 MANUAL THERAPY 1/> REGIONS: CPT

## 2025-02-25 NOTE — PROGRESS NOTES
retraction x 20 black band  Repeat      Proprioceptive Activities:                                                                        Manual Therapy: 30 mins  30 mins    15 mins   30 mins  45 mins  45 mins  35 mins  35 mins  10 mins  30 mins  30 mins  45 mins     Dry needling in the L paraspinal T4-6 and lower to the L1-L3 with ES in each spot x 8 mins each followed by STM  Repeat dry needling bilateral paraspinals T4-T7 over ribs with periosteal picking    SL needling in glute min and med   Needling R hip followed by STM in SL  Needling in the mid thoracic followed by STM for the paraspinals  Needling in the paraspinals on the L and the R with more on the L from T3-T12 followed by STM and mobilization  US bilateral paraspinals followed by STM in prone with mobilization of T4-L4  Needling in the cervical extensors and paraspinals into the UT bilaterally followed by STM in the thoracic paraspinal Seated STM for cervical spine and UT following exercise  Needling L paraspinal thoracic spine and into the scap region  Needling in the L lower paraspinal thoracic and into lumbar to SIJ Needling in the L paraspinal and down the thoracic into the R lumbar and toward the pelvis            Needling in the L side levator scapular region followed by STM and PROM       STM following needling  STM following needling    Functional Activities:                                                                                            FUNCTIONAL DRY NEEDLING: (5 minutes untimed):      With written consent received and precautions reviewed, instrument-assisted soft tissue mobilization was performed to:  Muscle(s): right  piriformis , glute med, lateral   Needle(s) size used: .30 x 60mm  Technique(s): use of pistoning techniques and periosteal pecking  For the purpose of: local twitch response that leads to alternation in length and tension of muscle fibers    The patient tolerated the treatment with a positive treatment effect and

## 2025-03-04 ENCOUNTER — HOSPITAL ENCOUNTER (OUTPATIENT)
Dept: PHYSICAL THERAPY | Age: 55
Setting detail: RECURRING SERIES
Discharge: HOME | End: 2025-03-07
Payer: MEDICARE

## 2025-03-04 PROCEDURE — 97110 THERAPEUTIC EXERCISES: CPT

## 2025-03-04 PROCEDURE — 97140 MANUAL THERAPY 1/> REGIONS: CPT

## 2025-03-04 NOTE — PROGRESS NOTES
focus on stretching the hip, strengthening and stability of the L knee, dry needling and massage for the L side of the thoracic spine and core stability for the lower back.    >Total Treatment Billable Duration:  40 minutes   Time In: 0205  Time Out: 0245    Teena Jennings PT         Charge Capture  Events  [facility] Portal  Appt Desk  Attendance Report     Future Appointments   Date Time Provider Department Center   3/11/2025  2:00 PM Teena Jennings PT [facility]   3/18/2025  2:45 PM Teena Jennings PT [facility]   3/25/2025  2:00 PM Teena Jennings PT [facility]   5/12/2025  1:30 PM Pradip Wagner MD [facility]

## 2025-03-07 ENCOUNTER — APPOINTMENT (OUTPATIENT)
Dept: PHYSICAL THERAPY | Age: 55
End: 2025-03-07
Payer: MEDICARE

## 2025-03-11 ENCOUNTER — HOSPITAL ENCOUNTER (OUTPATIENT)
Dept: PHYSICAL THERAPY | Age: 55
Setting detail: RECURRING SERIES
Discharge: HOME | End: 2025-03-14
Payer: MEDICARE

## 2025-03-11 PROCEDURE — 97110 THERAPEUTIC EXERCISES: CPT

## 2025-03-11 PROCEDURE — 97140 MANUAL THERAPY 1/> REGIONS: CPT

## 2025-03-11 NOTE — PROGRESS NOTES
Madeline Lynne  : 1970  Primary: [employer] (Medicare Managed)  Secondary:  [facility] [place] [address]  Phone: [phone]  Fax: [phone] Plan Frequency: 1 time a week      Plan of Care/Certification Expiration Date: 25        Plan of Care/Certification Expiration Date:  Plan of Care/Certification Expiration Date: 25    Frequency/Duration:   Plan Frequency: 1 time a week        Time In/Out:   Time In: 0200  Time Out: 0245      PT Visit Info:         Visit Count:  26    OUTPATIENT PHYSICAL THERAPY:   Treatment Note  3/11/2025       Episode  (thoracic pain/hip pain )               Treatment Diagnosis:    Pain in right hip  Pain in thoracic spine  Left knee pain, unspecified chronicity  Acute bilateral low back pain without sciatica  cervicalgia  Medical/Referring Diagnosis:    DDD (degenerative disc disease), cervical [M50.30]  Chondromalacia of left knee [M94.262]  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 She is seeing Dr. Garcia tomorrow regarding getting something topical compounded to take care of the pain.\"   Post Session Pain Level:       0 /10  Medications Last Reviewed:  3/11/2025  Updated Objective Findings:      Goals: (Goals have been discussed and agreed upon with patient.)  Short-Term Functional Goals: Time Frame: 4 weeks   Ms. Lynne to be independent with HEP.  (MET)  Pt able to return to normal daily

## 2025-03-14 ENCOUNTER — APPOINTMENT (OUTPATIENT)
Dept: PHYSICAL THERAPY | Age: 55
End: 2025-03-14
Payer: MEDICARE

## 2025-03-18 ENCOUNTER — HOSPITAL ENCOUNTER (OUTPATIENT)
Dept: PHYSICAL THERAPY | Age: 55
Setting detail: RECURRING SERIES
Discharge: HOME | End: 2025-03-21
Payer: MEDICARE

## 2025-03-18 PROCEDURE — 97110 THERAPEUTIC EXERCISES: CPT

## 2025-03-18 PROCEDURE — 97140 MANUAL THERAPY 1/> REGIONS: CPT

## 2025-03-18 NOTE — PROGRESS NOTES
Madeline Lynne  : 1970  Primary: [employer] (Medicare Managed)  Secondary:  [facility] [place] [address]  Phone: [phone]  Fax: [phone] Plan Frequency: 1 time a week      Plan of Care/Certification Expiration Date: 25        Plan of Care/Certification Expiration Date:  Plan of Care/Certification Expiration Date: 25    Frequency/Duration:   Plan Frequency: 1 time a week        Time In/Out:   Time In: 0245  Time Out: 0330      PT Visit Info:         Visit Count:  27    OUTPATIENT PHYSICAL THERAPY:   Treatment Note  3/18/2025       Episode  (thoracic pain/hip pain )               Treatment Diagnosis:    Pain in right hip  Pain in thoracic spine  Left knee pain, unspecified chronicity  Acute bilateral low back pain without sciatica  cervicalgia  Medical/Referring Diagnosis:    DDD (degenerative disc disease), cervical [M50.30]  Chondromalacia of left knee [M94.262]  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]      Referring Physician:  Rowena Garcia MD MD Orders:  PT Eval and Treat   Return MD Appt:  tbd   Date of Onset:  No data recorded   Acute on chronic episode   Allergies:   Latex, Aspirin, Amoxicillin, Erythromycin, Mometasone, Montelukast, and Petrolatum  Restrictions/Precautions:   None      Interventions Planned (Treatment may consist of any combination of the following):       See Assessment Note    Subjective Comments:   \"Im feeling better since the R hip was needled last time.\"   Post Session Pain Level:       0 /10  Medications Last Reviewed:  3/18/2025  Updated Objective Findings:      Goals: (Goals have been discussed and agreed upon with patient.)  Short-Term Functional Goals: Time Frame: 4 weeks   Ms. Lynne to be independent with HEP.  (MET)  Pt able to return to normal daily activities without knee pain. (MET)  Pt able to have no pain at rest in the thoracic spine and lower back. (Progressing)

## 2025-03-19 ENCOUNTER — APPOINTMENT (OUTPATIENT)
Dept: PHYSICAL THERAPY | Age: 55
End: 2025-03-19
Payer: MEDICARE

## 2025-03-25 ENCOUNTER — HOSPITAL ENCOUNTER (OUTPATIENT)
Dept: PHYSICAL THERAPY | Age: 55
Setting detail: RECURRING SERIES
Discharge: HOME | End: 2025-03-28
Payer: MEDICARE

## 2025-03-25 PROCEDURE — 97110 THERAPEUTIC EXERCISES: CPT

## 2025-03-25 PROCEDURE — 97140 MANUAL THERAPY 1/> REGIONS: CPT

## 2025-03-25 NOTE — PROGRESS NOTES
Madeline Lynne  : 1970  Primary: [employer] (Medicare Managed)  Secondary:  [facility] @ [place] [address]  Phone: [phone]  Fax: [phone] Plan Frequency: 1 time a week      Plan of Care/Certification Expiration Date: 25        Plan of Care/Certification Expiration Date:  Plan of Care/Certification Expiration Date: 25    Frequency/Duration:   Plan Frequency: 1 time a week        Time In/Out:   Time In: 0200  Time Out: 0245      PT Visit Info:         Visit Count:  28    OUTPATIENT PHYSICAL THERAPY:   Treatment Note  3/25/2025       Episode  (thoracic pain/hip pain )               Treatment Diagnosis:    Pain in right hip  Pain in thoracic spine  Left knee pain, unspecified chronicity  Acute bilateral low back pain without sciatica  cervicalgia  Medical/Referring Diagnosis:    DDD (degenerative disc disease), cervical [M50.30]  Chondromalacia of left knee [M94.262]  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]      Referring Physician:  Rowena Garcia MD MD Orders:  PT Eval and Treat   Return MD Appt:  tbd   Date of Onset:  No data recorded   Acute on chronic episode   Allergies:   Latex, Aspirin, Amoxicillin, Erythromycin, Mometasone, Montelukast, and Petrolatum  Restrictions/Precautions:   None      Interventions Planned (Treatment may consist of any combination of the following):       See Assessment Note    Subjective Comments:   \"Im getting ready to leave on my trip.\"   Post Session Pain Level:       0 /10  Medications Last Reviewed:  3/25/2025  Updated Objective Findings:      Goals: (Goals have been discussed and agreed upon with patient.)  Short-Term Functional Goals: Time Frame: 4 weeks   Ms. Lynne to be independent with HEP.  (MET)  Pt able to return to normal daily activities without knee pain. (MET)  Pt able to have no pain at rest in the thoracic spine and lower back. (Progressing)   Discharge Goals: Time

## 2025-03-27 ENCOUNTER — APPOINTMENT (OUTPATIENT)
Dept: PHYSICAL THERAPY | Age: 55
End: 2025-03-27
Payer: MEDICARE

## 2025-05-05 ENCOUNTER — HOSPITAL ENCOUNTER (OUTPATIENT)
Dept: PHYSICAL THERAPY | Age: 55
Setting detail: RECURRING SERIES
Discharge: HOME | End: 2025-05-08
Payer: MEDICARE

## 2025-05-05 PROCEDURE — 97110 THERAPEUTIC EXERCISES: CPT

## 2025-05-05 PROCEDURE — 97140 MANUAL THERAPY 1/> REGIONS: CPT

## 2025-05-05 NOTE — PROGRESS NOTES
Madeline Lynne  : 1970  Primary: [employer] (Medicare Managed)  Secondary:  [facility] [place] [address]  Phone: [phone]  Fax: [phone] Plan Frequency: 1 time a week      Plan of Care/Certification Expiration Date: 25        Plan of Care/Certification Expiration Date:  Plan of Care/Certification Expiration Date: 25    Frequency/Duration:   Plan Frequency: 1 time a week        Time In/Out:   Time In: 1145  Time Out: 1230      PT Visit Info:         Visit Count:  29    OUTPATIENT PHYSICAL THERAPY:   Treatment Note  2025       Episode  (thoracic pain/hip pain )               Treatment Diagnosis:    Pain in right hip  Pain in thoracic spine  Left knee pain, unspecified chronicity  Acute bilateral low back pain without sciatica  cervicalgia  Medical/Referring Diagnosis:    DDD (degenerative disc disease), cervical [M50.30]  Chondromalacia of left knee [M94.262]  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]      Referring Physician:  Rowena Garcia MD MD Orders:  PT Eval and Treat   Return MD Appt:  tbd   Date of Onset:  No data recorded   Acute on chronic episode   Allergies:   Latex, Aspirin, Amoxicillin, Erythromycin, Mometasone, Montelukast, and Petrolatum  Restrictions/Precautions:   None      Interventions Planned (Treatment may consist of any combination of the following):       See Assessment Note    Subjective Comments:   \"I did really well over my trip and I didn't get any worse and I was able to maintain when I needed to.\"   Post Session Pain Level:       0 /10  Medications Last Reviewed:  2025  Updated Objective Findings:      Goals: (Goals have been discussed and agreed upon with patient.)  Short-Term Functional Goals: Time Frame: 4 weeks   Ms. Lynne to be independent with HEP.  (MET)  Pt able to return to normal daily activities without knee pain. (MET)  Pt able to have no pain at rest in the thoracic

## 2025-05-05 NOTE — THERAPY RECERTIFICATION
Madeline Lynne  : 1970  Primary: [employer] (Medicare Managed)  Secondary:  [facility] [place] [address]  Phone: [phone]  Fax: [phone] Plan Frequency: 1 time a week    Plan of Care/Certification Expiration Date: 25        Plan of Care/Certification Expiration Date:  Plan of Care/Certification Expiration Date: 25    Frequency/Duration:   Plan Frequency: 1 time a week      Time In/Out:   Time In: 1145  Time Out: 1230      PT Visit Info:         Visit Count:  29                OUTPATIENT PHYSICAL THERAPY:             Recertification 2025               Episode (thoracic pain/hip pain )         Treatment Diagnosis:    Pain in right hip  Pain in thoracic spine  Left knee pain, unspecified chronicity  Acute bilateral low back pain without sciatica  cervicalgia  Medical/Referring Diagnosis:    DDD (degenerative disc disease), cervical [M50.30]  Chondromalacia of left knee [M94.262]  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 She also has some pain in the L knee which seems to be mostly arthritis and DDD.    L sided thoracic paraspinals more than the R side. The R hip is still giving her some trouble since returning from the trip. She was doing well with the hip while she was gone so she is thinking it might be her bed at home. Pt went on a cross country trip in her RV with her  and was gone for a month. She was doing her HEP and

## 2025-05-10 DIAGNOSIS — E03.9 PRIMARY HYPOTHYROIDISM: ICD-10-CM

## 2025-05-10 LAB
T4 FREE SERPL-MCNC: 1.69 NG/DL (ref 0.82–1.77)
TSH SERPL DL<=0.005 MIU/L-ACNC: 1.55 UIU/ML (ref 0.45–4.5)

## 2025-05-12 ENCOUNTER — OFFICE VISIT (OUTPATIENT)
Dept: ENDOCRINOLOGY | Age: 55
End: 2025-05-12
Payer: MEDICARE

## 2025-05-12 ENCOUNTER — HOSPITAL ENCOUNTER (OUTPATIENT)
Dept: PHYSICAL THERAPY | Age: 55
Setting detail: RECURRING SERIES
Discharge: HOME | End: 2025-05-15
Payer: MEDICARE

## 2025-05-12 VITALS
DIASTOLIC BLOOD PRESSURE: 62 MMHG | WEIGHT: 111 LBS | SYSTOLIC BLOOD PRESSURE: 112 MMHG | BODY MASS INDEX: 18.49 KG/M2 | HEIGHT: 65 IN

## 2025-05-12 DIAGNOSIS — D35.2 PITUITARY MICROADENOMA (HCC): ICD-10-CM

## 2025-05-12 DIAGNOSIS — E03.9 PRIMARY HYPOTHYROIDISM: Primary | ICD-10-CM

## 2025-05-12 DIAGNOSIS — E06.3 HASHIMOTO'S THYROIDITIS: ICD-10-CM

## 2025-05-12 PROCEDURE — G8419 CALC BMI OUT NRM PARAM NOF/U: HCPCS | Performed by: INTERNAL MEDICINE

## 2025-05-12 PROCEDURE — G8427 DOCREV CUR MEDS BY ELIG CLIN: HCPCS | Performed by: INTERNAL MEDICINE

## 2025-05-12 PROCEDURE — 97140 MANUAL THERAPY 1/> REGIONS: CPT

## 2025-05-12 PROCEDURE — 97110 THERAPEUTIC EXERCISES: CPT

## 2025-05-12 PROCEDURE — 3017F COLORECTAL CA SCREEN DOC REV: CPT | Performed by: INTERNAL MEDICINE

## 2025-05-12 PROCEDURE — 4004F PT TOBACCO SCREEN RCVD TLK: CPT | Performed by: INTERNAL MEDICINE

## 2025-05-12 PROCEDURE — 99213 OFFICE O/P EST LOW 20 MIN: CPT | Performed by: INTERNAL MEDICINE

## 2025-05-12 RX ORDER — ELECTROLYTES/DEXTROSE
SOLUTION, ORAL ORAL
COMMUNITY
Start: 2021-09-13

## 2025-05-12 RX ORDER — LEVOTHYROXINE SODIUM 50 MCG
100 TABLET ORAL DAILY
Qty: 180 TABLET | Refills: 3 | Status: SHIPPED | OUTPATIENT
Start: 2025-05-12

## 2025-05-12 RX ORDER — METHYLDOPA/HYDROCHLOROTHIAZIDE 250MG-25MG
TABLET ORAL
COMMUNITY
Start: 2023-04-19

## 2025-05-12 ASSESSMENT — ENCOUNTER SYMPTOMS
TROUBLE SWALLOWING: 0
VOICE CHANGE: 0

## 2025-05-12 NOTE — PROGRESS NOTES
LETTY Wagner MD, [facility] ENDOCRINOLOGY   AND   THYROID NODULE CLINIC            Reason for visit: Follow-up of hypothyroidism        ASSESSMENT AND PLAN:    1. Primary hypothyroidism  She is now biochemically euthyroid, excluding her thyroid as a source of symptoms.  Continue Synthroid as prescribed.  She will soon moved to [place] and will let me know if she needs a referral to an endocrinologist.  - SYNTHROID 50 MCG tablet; Take 2 tablets by mouth Daily  Dispense: 180 tablet; Refill: 3  - TSH; Future  - T4, Free; Future    2. Hashimoto's thyroiditis    3. Pituitary microadenoma (HCC)  This requires no additional evaluation.      Follow-up and Dispositions    Return if symptoms worsen or fail to improve.                 History of Present Illness:    THYROID DYSFUNCTION  Madeline Lynne is seen for follow-up of primary hypothyroidism; this was diagnosed in approximately 1989.  She is known to have Hashimoto's thyroiditis.     Current symptoms:  See review of systems below    Menstrual/pregnancy history: She is amenorrheic due to prior endometrial ablation.  She has no prior pregnancies and no plans for children.     Prior treatment: She was started on Synthroid at diagnosis in ~1989.  This was discontinued shortly thereafter.  Synthroid was resumed in approximately 2003.  She had taken 125 mcg (50 mcg tablets x 2.5) daily since December 2015.  Her dose has been adjusted as follows:  -125 mcg daily six days per week and 100 mcg daily one day per week 3/12/2018   -112.5 mcg daily 6/12/2018  -100 mcg daily 3/14/2022     Pertinent labs:  1/17/2012: TSH 4.700, free T4 1.56.  7/18/2012: TSH 3.000, free T4 1.58.  7/19/2013: TSH 2.0, free T4 0.98.  12/12/2013: TSH 3.080, T4 10.9.  8/19/2014: TSH 5.170, T4 9.6, free thyroxine index 2.6.  10/21/2014: TSH 1.390, T4 11.6, free thyroxine index 3.2.  12/22/2014: TSH 0.792, free T4 1.55.  6/17/2015: TSH 2.13, T4 11.2, free thyroxine index 3.0.  12/10/2015:

## 2025-05-12 NOTE — PROGRESS NOTES
Madeline Lynne  : 1970  Primary: [employer] (Medicare Managed)  Secondary:  [facility] [place] [address]  Phone: [phone]  Fax: [phone] Plan Frequency: 1 time a week      Plan of Care/Certification Expiration Date: 25        Plan of Care/Certification Expiration Date:  Plan of Care/Certification Expiration Date: 25    Frequency/Duration:   Plan Frequency: 1 time a week        Time In/Out:   Time In: 1100  Time Out: 1140      PT Visit Info:         Visit Count:  30    OUTPATIENT PHYSICAL THERAPY:   Treatment Note  2025       Episode  (thoracic pain/hip pain )               Treatment Diagnosis:    Pain in right hip  Pain in thoracic spine  Left knee pain, unspecified chronicity  Acute bilateral low back pain without sciatica  cervicalgia  Medical/Referring Diagnosis:    DDD (degenerative disc disease), cervical [M50.30]  Chondromalacia of left knee [M94.262]  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 The hip is still giving me some trouble and the front of the pelvis gave me some trouble after the needling but the needling definitely helped.\"   Post Session Pain Level:       0 /10  Medications Last Reviewed:  2025  Updated Objective Findings:      Goals: (Goals have been discussed and agreed upon with patient.)  Short-Term Functional Goals: Time Frame: 4 weeks   Ms. Lynne to be independent with HEP.  (MET)  Pt able to return to

## 2025-05-19 ENCOUNTER — HOSPITAL ENCOUNTER (OUTPATIENT)
Dept: PHYSICAL THERAPY | Age: 55
Setting detail: RECURRING SERIES
Discharge: HOME | End: 2025-05-22
Payer: MEDICARE

## 2025-05-19 PROCEDURE — 97140 MANUAL THERAPY 1/> REGIONS: CPT

## 2025-05-19 PROCEDURE — 97110 THERAPEUTIC EXERCISES: CPT

## 2025-05-19 NOTE — PROGRESS NOTES
Madeline Lynne  : 1970  Primary: [employer] (Medicare Managed)  Secondary:  [facility] [place] [address]  Phone: [phone]  Fax: [phone] Plan Frequency: 1 time a week      Plan of Care/Certification Expiration Date: 25        Plan of Care/Certification Expiration Date:  Plan of Care/Certification Expiration Date: 25    Frequency/Duration:   Plan Frequency: 1 time a week        Time In/Out:   Time In: 1145  Time Out: 1230      PT Visit Info:         Visit Count:  31    OUTPATIENT PHYSICAL THERAPY:   Treatment Note  2025       Episode  (thoracic pain/hip pain )               Treatment Diagnosis:    Pain in right hip  Pain in thoracic spine  Left knee pain, unspecified chronicity  Acute bilateral low back pain without sciatica  cervicalgia  Medical/Referring Diagnosis:    DDD (degenerative disc disease), cervical [M50.30]  Chondromalacia of left knee [M94.262]  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 Everything seems to be getting back to normal and I'm not having that pain.\"   Post Session Pain Level:       0 /10  Medications Last Reviewed:  2025  Updated Objective Findings:      Goals: (Goals have been discussed and agreed upon with patient.)  Short-Term Functional Goals: Time Frame: 4 weeks   Ms. Lynne to be independent with HEP.  (MET)  Pt able to return to normal daily activities without knee pain. (MET)  Pt

## 2025-05-27 ENCOUNTER — HOSPITAL ENCOUNTER (OUTPATIENT)
Dept: PHYSICAL THERAPY | Age: 55
Setting detail: RECURRING SERIES
Discharge: HOME | End: 2025-05-30
Payer: MEDICARE

## 2025-05-27 PROCEDURE — 97140 MANUAL THERAPY 1/> REGIONS: CPT

## 2025-05-27 PROCEDURE — 97110 THERAPEUTIC EXERCISES: CPT

## 2025-05-27 NOTE — PROGRESS NOTES
Madeline Lynne  : 1970  Primary: [employer] (Medicare Managed)  Secondary:  [facility] [place] [address]  Phone: [phone]  Fax: [phone] Plan Frequency: 1 time a week      Plan of Care/Certification Expiration Date: 25        Plan of Care/Certification Expiration Date:  Plan of Care/Certification Expiration Date: 25    Frequency/Duration:   Plan Frequency: 1 time a week        Time In/Out:   Time In: 0200  Time Out: 0258      PT Visit Info:         Visit Count:  32    OUTPATIENT PHYSICAL THERAPY:   Treatment Note and Progress Note 2025       Episode  (thoracic pain/hip pain )               Treatment Diagnosis:    Pain in right hip  Pain in thoracic spine  Left knee pain, unspecified chronicity  Acute bilateral low back pain without sciatica  cervicalgia  Medical/Referring Diagnosis:    DDD (degenerative disc disease), cervical [M50.30]  Chondromalacia of left knee [M94.262]  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]      Referring Physician:  Rowena Garcia MD MD Orders:  PT Eval and Treat   Return MD Appt:  tbd   Date of Onset:  No data recorded   Acute on chronic episode   Allergies:   Latex, Aspirin, Amoxicillin, Erythromycin, Mometasone, Montelukast, and Petrolatum  Restrictions/Precautions:   None      Interventions Planned (Treatment may consist of any combination of the following):       See Assessment Note    Subjective Comments:   \"I actually did yard work this weekend and the arm and back felt fine and I didn't have to stop because of pain it was just fatigue.\" \"The hip is still bothering me and the point is in the same spot.\"   Post Session Pain Level:       0 /10  Medications Last Reviewed:  2025  Updated Objective Findings:      Goals: (Goals have been discussed and agreed upon with patient.)  Short-Term Functional Goals: Time Frame: 4 weeks   Ms. Lynne to be independent with HEP.  (MET)  Pt

## 2025-06-05 ENCOUNTER — HOSPITAL ENCOUNTER (OUTPATIENT)
Dept: PHYSICAL THERAPY | Age: 55
Setting detail: RECURRING SERIES
Discharge: HOME | End: 2025-06-08
Payer: MEDICARE

## 2025-06-05 PROCEDURE — 97140 MANUAL THERAPY 1/> REGIONS: CPT

## 2025-06-05 PROCEDURE — 97110 THERAPEUTIC EXERCISES: CPT

## 2025-06-05 NOTE — PROGRESS NOTES
Madeline Lynne  : 1970  Primary: [employer] (Medicare Managed)  Secondary:  [facility] @ [place] [address]  Phone: [phone]  Fax: [phone] Plan Frequency: 1 time a week      Plan of Care/Certification Expiration Date: 25        Plan of Care/Certification Expiration Date:  Plan of Care/Certification Expiration Date: 25    Frequency/Duration:   Plan Frequency: 1 time a week        Time In/Out:   Time In: 845  Time Out: 930      PT Visit Info:         Visit Count:  33    OUTPATIENT PHYSICAL THERAPY:   Treatment Note 2025       Episode  (thoracic pain/hip pain )               Treatment Diagnosis:    Pain in right hip  Pain in thoracic spine  Left knee pain, unspecified chronicity  Acute bilateral low back pain without sciatica  cervicalgia  Medical/Referring Diagnosis:    DDD (degenerative disc disease), cervical [M50.30]  Chondromalacia of left knee [M94.262]  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 I sold my house and I've been stressed.\"   Post Session Pain Level:       0 /10  Medications Last Reviewed:  2025  Updated Objective Findings:      Goals: (Goals have been discussed and agreed upon with patient.)  Short-Term Functional Goals: Time Frame: 4 weeks   Ms. Lynne to be independent with HEP.  (MET)  Pt able to return to normal daily activities without knee pain. (MET)  Pt able to have no pain at rest in the thoracic spine and lower back. (MET)  Discharge

## 2025-06-13 ENCOUNTER — HOSPITAL ENCOUNTER (OUTPATIENT)
Dept: PHYSICAL THERAPY | Age: 55
Setting detail: RECURRING SERIES
Discharge: HOME | End: 2025-06-16
Payer: MEDICARE

## 2025-06-13 PROCEDURE — 97140 MANUAL THERAPY 1/> REGIONS: CPT

## 2025-06-13 NOTE — PROGRESS NOTES
30 mins  45 mins     Needling L paraspinal thoracic spine and into the scap region  Needling in the L lower paraspinal thoracic and into lumbar to SIJ Needling in the L paraspinal and down the thoracic into the R lumbar and toward the pelvis  Needling L paraspinal and around lower rib around 10th rib up to 8th rib on L side  Repeat needling in the L paraspinal and around Rib     Needling done in the R transverse abdominal and internal oblique at R pelvic bone and on outside of the pelvic region on R hip  Needling in the thoracic paraspinal L side under scapula from T1-T7 followed by STM and scap stretching     Needling in the R internal oblique and R pubic crest followed by STM   Needling iliac crest in standing on the R side     Needling in the thoracic spine and subscap into levator in SL on the L side  Repeat needling in iliac crest in standing on the R side (pt had to lie down following today)   Needing in the L lower paraspinal and across 12th rib in prone with STM following  Repeat adding in more fof the pubic bone and superior to the hip as well a the pelvic crest. Needling same region with trigger point release manually  Needling standing in pelvic region and supine in pubis followed by needling in prone to the L paraspinal and into the scap border near levator attachement  Repeat  Needling supine in the hip and prone for the thoracic paraspinal followed by STM in both areas and stretching with trigger point release     STM following needling  STM following needling  STM with muscle mobilization following needling    STM thoracic and UT on the L side in SL following needling   Repeat STM following needling working into the hip and the pubic bone anteriorly Repeat  Repeat  Repeat  Repeat    Functional Activities:                                                                                  FUNCTIONAL DRY NEEDLING: (5 minutes untimed):      With written consent received and precautions reviewed,

## 2025-06-17 ENCOUNTER — HOSPITAL ENCOUNTER (OUTPATIENT)
Dept: PHYSICAL THERAPY | Age: 55
Setting detail: RECURRING SERIES
Discharge: HOME | End: 2025-06-20
Payer: MEDICARE

## 2025-06-17 PROCEDURE — 97140 MANUAL THERAPY 1/> REGIONS: CPT

## 2025-06-17 PROCEDURE — 97110 THERAPEUTIC EXERCISES: CPT

## 2025-06-17 NOTE — PROGRESS NOTES
Madeline Lynne  : 1970  Primary: [employer] (Medicare Managed)  Secondary:  [facility] @ [place] [address]  Phone: [phone]  Fax: [phone] Plan Frequency: 1 time a week      Plan of Care/Certification Expiration Date: 25        Plan of Care/Certification Expiration Date:  Plan of Care/Certification Expiration Date: 25    Frequency/Duration:   Plan Frequency: 1 time a week        Time In/Out:   Time In: 1100  Time Out: 1145      PT Visit Info:         Visit Count:  35    OUTPATIENT PHYSICAL THERAPY:   Treatment Note 2025       Episode  (thoracic pain/hip pain )               Treatment Diagnosis:    Pain in right hip  Pain in thoracic spine  Left knee pain, unspecified chronicity  Acute bilateral low back pain without sciatica  cervicalgia  Medical/Referring Diagnosis:    DDD (degenerative disc disease), cervical [M50.30]  Chondromalacia of left knee [M94.262]  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 Im not having pain in the back or the hip.\"   Post Session Pain Level:       0 /10  Medications Last Reviewed:  2025  Updated Objective Findings:      Goals: (Goals have been discussed and agreed upon with patient.)  Short-Term Functional Goals: Time Frame: 4 weeks   Ms. Lynne to be independent with HEP.  (MET)  Pt able to return to normal daily activities without knee pain. (MET)  Pt able to have no pain at rest in the thoracic spine and lower back.

## 2025-06-19 ENCOUNTER — HOSPITAL ENCOUNTER (OUTPATIENT)
Dept: PHYSICAL THERAPY | Age: 55
Setting detail: RECURRING SERIES
Discharge: HOME | End: 2025-06-22
Payer: MEDICARE

## 2025-06-19 PROCEDURE — 97110 THERAPEUTIC EXERCISES: CPT

## 2025-06-19 PROCEDURE — 97140 MANUAL THERAPY 1/> REGIONS: CPT

## 2025-06-19 NOTE — PROGRESS NOTES
Madeline Lynne  : 1970  Primary: [employer] (Medicare Managed)  Secondary:  [facility] [place] [address]  Phone: [phone]  Fax: [phone] Plan Frequency: 1 time a week      Plan of Care/Certification Expiration Date: 25        Plan of Care/Certification Expiration Date:  Plan of Care/Certification Expiration Date: 25    Frequency/Duration:   Plan Frequency: 1 time a week        Time In/Out:   Time In: 0800  Time Out: 0844      PT Visit Info:         Visit Count:  36    OUTPATIENT PHYSICAL THERAPY:   Treatment Note 2025       Episode  (thoracic pain/hip pain )               Treatment Diagnosis:    Pain in right hip  Pain in thoracic spine  Left knee pain, unspecified chronicity  Acute bilateral low back pain without sciatica  cervicalgia  Medical/Referring Diagnosis:    DDD (degenerative disc disease), cervical [M50.30]  Chondromalacia of left knee [M94.262]  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 Im not having pain in the back or the hip.\"   Post Session Pain Level:       0 /10  Medications Last Reviewed:  2025  Updated Objective Findings:      Goals: (Goals have been discussed and agreed upon with patient.)  Short-Term Functional Goals: Time Frame: 4 weeks   Ms. Lynne to be independent with HEP.  (MET)  Pt able to return to normal daily activities without knee pain. (MET)  Pt able to have no pain at rest in the thoracic spine and lower back.

## 2025-06-27 ENCOUNTER — APPOINTMENT (OUTPATIENT)
Dept: PHYSICAL THERAPY | Age: 55
End: 2025-06-27
Payer: MEDICARE